# Patient Record
Sex: FEMALE | Race: WHITE | NOT HISPANIC OR LATINO | Employment: UNEMPLOYED | URBAN - METROPOLITAN AREA
[De-identification: names, ages, dates, MRNs, and addresses within clinical notes are randomized per-mention and may not be internally consistent; named-entity substitution may affect disease eponyms.]

---

## 2021-03-18 ENCOUNTER — OFFICE VISIT (OUTPATIENT)
Dept: GASTROENTEROLOGY | Facility: CLINIC | Age: 31
End: 2021-03-18

## 2021-03-18 VITALS
DIASTOLIC BLOOD PRESSURE: 74 MMHG | SYSTOLIC BLOOD PRESSURE: 106 MMHG | WEIGHT: 122.2 LBS | TEMPERATURE: 97.8 F | BODY MASS INDEX: 21.65 KG/M2 | HEART RATE: 80 BPM | HEIGHT: 63 IN | OXYGEN SATURATION: 98 %

## 2021-03-18 DIAGNOSIS — K59.00 CONSTIPATION, UNSPECIFIED CONSTIPATION TYPE: Primary | ICD-10-CM

## 2021-03-18 DIAGNOSIS — R63.0 ANOREXIA: ICD-10-CM

## 2021-03-18 DIAGNOSIS — K58.1 IRRITABLE BOWEL SYNDROME WITH CONSTIPATION: ICD-10-CM

## 2021-03-18 PROCEDURE — 99204 OFFICE O/P NEW MOD 45 MIN: CPT | Performed by: INTERNAL MEDICINE

## 2021-03-18 RX ORDER — TRAZODONE HYDROCHLORIDE 100 MG/1
50 TABLET ORAL
COMMUNITY
Start: 2021-02-20

## 2021-03-18 RX ORDER — ESCITALOPRAM OXALATE 20 MG/1
40 TABLET ORAL EVERY MORNING
COMMUNITY
Start: 2021-03-06 | End: 2023-01-13

## 2021-03-18 NOTE — PATIENT INSTRUCTIONS
1. For constipation, we will have you start Trulance 3 mg once daily with or without food. We have provided you with samples. If you find that it works well for you or if it does not work well, please contact our office at [phone].    2. Maintain at least 8 full glasses of water intake daily.    3. Recommend telephone follow up in 4 weeks with NP for reassessment.

## 2021-03-18 NOTE — PROGRESS NOTES
"Chief Complaint   Patient presents with   • Constipation          History of Present Illness  31-year old female presents today for evaluation for constipation. She reports having a colonoscopy at the age of 19. She has a history of anorexia last year and has since recovered. She reports that constipation was worse when she restricted food. She eventually began having Bms daily to every other day. She did have COVID, which seemed to set her back. She tried MiraLax daily x 7 days with no BM. She did produce a BM with administration of an enema. She then restarted MiraLax with no relief. She tried Milk of Magnesia, which took 12 hours to produce a BM. She reports significant bloating and gas, especially when she has gone several days without having a BM. She has also tried Fiber supplements with no relief. She maintains adequate fluid hydration. She denies any family history of colon cancer. She denies any blood in her stool. Last TSH was normal on 11/4/2019.       Review of Systems   Constitutional: Negative.    HENT: Negative for trouble swallowing.    Cardiovascular: Negative for chest pain.   Gastrointestinal: Positive for abdominal distention and constipation. Negative for abdominal pain, anal bleeding, blood in stool, diarrhea, nausea, rectal pain and vomiting.        Result Review :       TSH (11/04/2019 10:56)      Vital Signs:   /74   Pulse 80   Temp 97.8 °F (36.6 °C)   Ht 160 cm (63\")   Wt 55.4 kg (122 lb 3.2 oz)   SpO2 98%   BMI 21.65 kg/m²     Body mass index is 21.65 kg/m².     Physical Exam  Vitals reviewed.   Constitutional:       Appearance: Normal appearance.   HENT:      Nose: No nasal deformity.   Eyes:      General: No scleral icterus.  Neck:      Comments: Trachea midline.  Cardiovascular:      Rate and Rhythm: Normal rate and regular rhythm.   Pulmonary:      Effort: No respiratory distress.      Breath sounds: Normal breath sounds.   Abdominal:      General: Bowel sounds are normal. " There is no distension.      Palpations: Abdomen is soft. There is no mass.      Tenderness: There is no abdominal tenderness.   Lymphadenopathy:      Comments: No periumbilical lymphadenopathy.   Skin:     General: Skin is warm.   Neurological:      Mental Status: She is alert.           Assessment and Plan    Diagnoses and all orders for this visit:    1. Constipation, unspecified constipation type (Primary)    2. Irritable bowel syndrome with constipation    3. Anorexia         Documentation by Zaina CEJA acting as a scribe in the following sections (HPI, Assessment, Plan) for the undersigned provider.       I have reviewed and confirmed the accuracy of the HPI and Assessment and Plan as documented by the APRN Gibran Vickers MD        Follow Up   Return in about 4 weeks (around 4/15/2021).    Patient Instructions   1. For constipation, we will have you start Trulance 3 mg once daily with or without food. We have provided you with samples. If you find that it works well for you or if it does not work well, please contact our office at [phone].    2. Maintain at least 8 full glasses of water intake daily.    3. Recommend telephone follow up in 4 weeks with NP for reassessment.         Discussion:    For constipation, we will prescribe Trulance 3 mg once daily. Patient to call with update in 1-2 weeks. Plan for telephone visit in 4 weeks.

## 2021-05-11 NOTE — PROGRESS NOTES
Chief Complaint   Patient presents with   • Constipation         History of Present Illness  31-year-old female presents today for telephone follow-up.  She was last seen in office on 3/18/2021.  She is a history of constipation.  Previous treatments have included MiraLAX, milk of magnesia, and an enema.  At her last office visit we started her on Trulance and recommended that she maintain adequate fluid intake.  Last TSH November 2019 was normal at 0.907.    She reports that with taking Trulance daily she has only had 1-2 solid stools over the past month.  She states that her stool is watery and she is experiencing a small amount of bloating.  She denies any melena or hematochezia.  She has a history of anorexia nervosa and sees a nutritionist, and is doing well from that standpoint.  She reports that her weight is stable at 123 pounds.  She has noted that fried foods, particularly fried chicken, seem to trigger her IBS-C symptoms.    She denies having any heartburn, reflux, nausea, vomiting, or dysphagia.  She does report some belching.    You have chosen to receive care through a telephone visit.   Do you consent to use a telephone visit for your medical care today? Yes    Review of Systems   Constitutional: Negative for fever and unexpected weight change.   HENT: Negative for trouble swallowing.    Cardiovascular: Negative for chest pain.   Gastrointestinal: Positive for constipation and diarrhea. Negative for abdominal distention, abdominal pain, anal bleeding, blood in stool, nausea, rectal pain and vomiting.         Result Review :      Office Visit with Gibran Vickers MD (03/18/2021)  CBC AND DIFFERENTIAL (11/04/2019 10:56)  TSH (11/04/2019 10:56)    Assessment and Plan    Diagnoses and all orders for this visit:    1. Constipation, unspecified constipation type (Primary)    Other orders  -     linaclotide (Linzess) 72 MCG capsule capsule; Take 1 capsule by mouth Every Morning Before Breakfast.   Dispense: 90 capsule; Refill: 3         This visit has been rescheduled as a phone visit to comply with patient safety concerns in accordance with CDC recommendations. Total time of discussion was 12 minutes.    Follow Up   Return in about 6 weeks (around 6/23/2021).    Patient Instructions   1.  Discontinue Trulance.    2.  We have sent in a prescription for Linzess 72 mcg.  You will take 1 tablet first thing in the morning each day on an empty stomach.  This prescription has been sent to your pharmacy.  Should you experience significant diarrhea with use, please contact our office for further recommendations.  Also, if this medication is cost prohibitive, please contact our office and we will recommend an alternative therapy.    3.  We will plan for telephone follow-up visit in 6 weeks on 6/23/2021 at 9:15 AM for reassessment of symptoms, or sooner should symptoms worsen or fail to respond accordingly to Linzess.       Discussion:    Bowel movements are watery with Trulance daily.  Patient felt that even if she went to every other day dosing that her stools would not be formed. We discussed the potential option of every other day Trulance dosing.  At this point in time we will prescribe Linzess 72 mcg once daily to see how well patient's symptoms respond.  Amitiza 8 mcg would have been my original choice with once daily dosing, but due to insurance coverage, this did not seem to be a viable option.  Patient was instructed to contact the office if the Linzess caused diarrhea and/or if the prescription was cost prohibitive.  At that time we may be able to try Amitiza if Linzess causes side effects, or switch gears and possibly try a combination of MiraLAX and a fiber supplement.  We will plan for telephone follow-up in 6 weeks for reassessment of symptoms, or sooner should symptoms worsen or fail to improve.  Patient verbalized understand above plan of care and is in agreement.  All questions answered and support  provided.    EMR Dragon/Transcription Disclaimer:  Much of this encounter note is an electronic transcription/translation of spoken language to printed text.  The electronic translation of spoken language may permit erroneous or at times nonsensical words or phrases to be inadvertently transcribed; although I have reviewed the note for such errors, some may still exist.

## 2021-05-12 ENCOUNTER — OFFICE VISIT (OUTPATIENT)
Dept: GASTROENTEROLOGY | Facility: CLINIC | Age: 31
End: 2021-05-12

## 2021-05-12 DIAGNOSIS — R19.7 DIARRHEA, UNSPECIFIED TYPE: ICD-10-CM

## 2021-05-12 DIAGNOSIS — K59.00 CONSTIPATION, UNSPECIFIED CONSTIPATION TYPE: Primary | ICD-10-CM

## 2021-05-12 PROCEDURE — 99214 OFFICE O/P EST MOD 30 MIN: CPT | Performed by: NURSE PRACTITIONER

## 2021-05-12 NOTE — TELEPHONE ENCOUNTER
Pt called and states the Linzess sent in to her pharmacy after today's telephone visit with Zaina is too expensive. She is wanting to know if there is an alternate med she can try. Please advise.

## 2021-05-12 NOTE — PATIENT INSTRUCTIONS
1.  Discontinue Trulance.    2.  We have sent in a prescription for Linzess 72 mcg.  You will take 1 tablet first thing in the morning each day on an empty stomach.  This prescription has been sent to your pharmacy.  Should you experience significant diarrhea with use, please contact our office for further recommendations.  Also, if this medication is cost prohibitive, please contact our office and we will recommend an alternative therapy.    3.  We will plan for telephone follow-up visit in 6 weeks on 6/23/2021 at 9:15 AM for reassessment of symptoms, or sooner should symptoms worsen or fail to respond accordingly to Linzess.

## 2021-05-13 RX ORDER — PLECANATIDE 3 MG/1
3 TABLET ORAL DAILY
Qty: 90 TABLET | Refills: 3 | Status: SHIPPED | OUTPATIENT
Start: 2021-05-13 | End: 2022-05-16

## 2021-05-18 ENCOUNTER — TELEPHONE (OUTPATIENT)
Dept: GASTROENTEROLOGY | Facility: CLINIC | Age: 31
End: 2021-05-18

## 2021-05-18 NOTE — TELEPHONE ENCOUNTER
Patient called stating that she is on Trulance, but she states that it hasn't been working. She states that she has been taking it daily. She states that she has had bowel movements, but not completely. She states that when she does have bowel movements, then it is just water. She states that on Sunday, she was super constipated. Please advise.

## 2021-05-18 NOTE — TELEPHONE ENCOUNTER
Patient would like to continue the Trulance. She states that she spent $180 on the medication two days ago. Please advise if she should continue the Trulance.

## 2021-05-25 ENCOUNTER — TELEPHONE (OUTPATIENT)
Dept: GASTROENTEROLOGY | Facility: CLINIC | Age: 31
End: 2021-05-25

## 2021-05-25 NOTE — TELEPHONE ENCOUNTER
Irasema, the Nutritionist for Rachell called stating Rachell has had an eating disorder in the past. She wanted to check with provider to see how restricted she needs to be on the IBS-D diet. Patient was told to avoid fried and oiled foods. The nutritionist is requesting the least restricted diet for the patient. Please advise. Irasema call back # is [phone].

## 2021-07-06 ENCOUNTER — OFFICE VISIT (OUTPATIENT)
Dept: GASTROENTEROLOGY | Facility: CLINIC | Age: 31
End: 2021-07-06

## 2021-07-06 DIAGNOSIS — K59.00 CONSTIPATION, UNSPECIFIED CONSTIPATION TYPE: Primary | Chronic | ICD-10-CM

## 2021-07-06 PROCEDURE — 99213 OFFICE O/P EST LOW 20 MIN: CPT | Performed by: NURSE PRACTITIONER

## 2021-07-06 NOTE — PROGRESS NOTES
Chief Complaint   Patient presents with   • Follow-up     constipation         History of Present Illness  31-year-old female presents today for telephone follow-up.  She was last seen in office on 5/12/2021.  She has a history of constipation.  Previous treatments have included MiraLAX, milk of magnesia, enemas and Trulance.  She has noted that fried foods such as fried chicken and red meats will worsen symptoms.     She was initially taking Trulance, but it was working a little too well for her and was causing some watery stools.  Linzess 72 mcg was prescribed at her last office visit, but was very cost prohibitive patient did not fill prescription.  Patient reports today that Trulance prescription is costing her $180 per month.  She has reverted back to taking the Trulance due to the cost prohibitive nature of Linzess.  She states that generally she will have a bowel movement in the morning within 2 hours after taking the Trulance that generally consists of a very loose/watery BM.  However, occasionally she will have normal stools.  He has never tried taking the Trulance every other day.  She denies any bloating, abdominal pain, melena, or hematochezia.    You have chosen to receive care through a telephone visit.   Do you consent to use a telephone visit for your medical care today? Yes    Review of Systems   Constitutional: Negative for fever and unexpected weight change.   HENT: Negative for trouble swallowing.    Cardiovascular: Negative for chest pain.   Gastrointestinal: Positive for constipation and diarrhea. Negative for abdominal distention, abdominal pain, anal bleeding, blood in stool, nausea, rectal pain and vomiting.         Result Review :      Office Visit with Zaina Mcneil APRN (05/12/2021)  CBC AND DIFFERENTIAL (11/04/2019 10:56)  TSH (11/04/2019 10:56)    Assessment and Plan    Diagnoses and all orders for this visit:    1. Constipation, unspecified constipation type (Primary)       I spent  13 minutes caring for Rachell on this date of service. This time includes time spent by me in the following activities:preparing for the visit, performing a medically appropriate examination and/or evaluation , ordering medications, tests, or procedures and documenting information in the medical record  This visit has been rescheduled as a phone visit to comply with patient safety concerns in accordance with CDC recommendations. Total time of discussion was 10 minutes.    Follow Up   Return for pending test results and symptoms.    Patient Instructions   1.  For constipation continue Trulance.  You may find that taking this medication every other day helps to better relate your bowel habits without causing the watery stools.    2.  We have put together a sample package of a coupon co-pay card for Trulance, some Trulance samples as well as some samples of the Linzess 72 mcg tablets.  You may want to try the Linzess to see how well it works.  If you find that it works better than the Trulance, you can download a coupon card and we can send in a prescription accordingly.  Do not take the Trulance and Linzess together.  This way we can determine which medication works best to manage her symptoms.    3.  Please contact our office with an update of your symptoms in the next 3 to 4 weeks at [phone] once you have determined which medication works best for you.  Next office follow-up to be determined based upon your response to the above.     Discussion:    Patient was unable to  the Linzess prescription due to cost following her last office visit.  She reverted back to use of Trulance which is causing loose and watery stools most of the time, but does work well to manage her constipation.  She would rather have the loose stools than experience constipation.  However, Trulance is costing her $180 per month.  We have put together a sample package with a Trulance coupon co-pay card, samples of Trulance as well as  samples of Linzess 72 mcg which was prescribed at her last office visit.  Patient was encouraged to try Trulance every other day to see how well that works to manage symptoms and if that did not work well to try to Linzess 72 mcg daily to every other day.  Patient to then contact the office with an update of her symptoms in 3 to 4 weeks to let us know which prescription works best for her and we will prescribe accordingly.  Patient verbalized understand above plan of care and is in agreement.  All questions answered and support provided.      EMR Dragon/Transcription Disclaimer:  This document has been Dictated utilizing Dragon dictation.

## 2022-04-08 ENCOUNTER — TRANSCRIBE ORDERS (OUTPATIENT)
Dept: ADMINISTRATIVE | Facility: HOSPITAL | Age: 32
End: 2022-04-08

## 2022-04-08 DIAGNOSIS — R10.9 RIGHT FLANK PAIN: Primary | ICD-10-CM

## 2022-04-09 ENCOUNTER — HOSPITAL ENCOUNTER (OUTPATIENT)
Dept: CT IMAGING | Facility: HOSPITAL | Age: 32
Discharge: HOME | End: 2022-04-09
Admitting: NURSE PRACTITIONER

## 2022-04-09 DIAGNOSIS — R10.9 RIGHT FLANK PAIN: ICD-10-CM

## 2022-04-09 PROCEDURE — 74176 CT ABD & PELVIS W/O CONTRAST: CPT

## 2022-05-16 RX ORDER — PLECANATIDE 3 MG/1
3 TABLET ORAL DAILY
Qty: 90 TABLET | Refills: 0 | Status: SHIPPED | OUTPATIENT
Start: 2022-05-16 | End: 2022-08-17

## 2022-08-17 RX ORDER — PLECANATIDE 3 MG/1
3 TABLET ORAL DAILY
Qty: 90 TABLET | Refills: 0 | Status: SHIPPED | OUTPATIENT
Start: 2022-08-17 | End: 2022-12-01

## 2022-12-01 RX ORDER — PLECANATIDE 3 MG/1
3 TABLET ORAL DAILY
Qty: 90 TABLET | Refills: 0 | Status: SHIPPED | OUTPATIENT
Start: 2022-12-01

## 2022-12-29 ENCOUNTER — HOSPITAL ENCOUNTER (EMERGENCY)
Facility: HOSPITAL | Age: 32
Discharge: HOME | End: 2022-12-29
Attending: EMERGENCY MEDICINE | Admitting: EMERGENCY MEDICINE
Payer: COMMERCIAL

## 2022-12-29 ENCOUNTER — APPOINTMENT (OUTPATIENT)
Dept: CT IMAGING | Facility: HOSPITAL | Age: 32
End: 2022-12-29
Payer: COMMERCIAL

## 2022-12-29 VITALS
HEART RATE: 75 BPM | TEMPERATURE: 97.9 F | OXYGEN SATURATION: 100 % | DIASTOLIC BLOOD PRESSURE: 78 MMHG | WEIGHT: 130.95 LBS | SYSTOLIC BLOOD PRESSURE: 102 MMHG | HEIGHT: 63 IN | BODY MASS INDEX: 23.2 KG/M2 | RESPIRATION RATE: 12 BRPM

## 2022-12-29 DIAGNOSIS — R10.11 COLICKY RUQ ABDOMINAL PAIN: ICD-10-CM

## 2022-12-29 DIAGNOSIS — R11.0 NAUSEA: ICD-10-CM

## 2022-12-29 DIAGNOSIS — R79.89 ELEVATED LFTS: ICD-10-CM

## 2022-12-29 DIAGNOSIS — K80.20 CALCULUS OF GALLBLADDER WITHOUT CHOLECYSTITIS WITHOUT OBSTRUCTION: Primary | ICD-10-CM

## 2022-12-29 LAB
ALBUMIN SERPL-MCNC: 4.6 G/DL (ref 3.5–5.2)
ALBUMIN/GLOB SERPL: 1.6 G/DL
ALP SERPL-CCNC: 73 U/L (ref 39–117)
ALT SERPL W P-5'-P-CCNC: 61 U/L (ref 1–33)
ANION GAP SERPL CALCULATED.3IONS-SCNC: 9 MMOL/L (ref 5–15)
AST SERPL-CCNC: 96 U/L (ref 1–32)
B-HCG UR QL: NEGATIVE
BACTERIA UR QL AUTO: ABNORMAL /HPF
BASOPHILS # BLD AUTO: 0 10*3/MM3 (ref 0–0.2)
BASOPHILS NFR BLD AUTO: 0.3 % (ref 0–1.5)
BILIRUB SERPL-MCNC: 0.5 MG/DL (ref 0–1.2)
BILIRUB UR QL STRIP: NEGATIVE
BUN SERPL-MCNC: 7 MG/DL (ref 6–20)
BUN/CREAT SERPL: 10.6 (ref 7–25)
CALCIUM SPEC-SCNC: 9.2 MG/DL (ref 8.6–10.5)
CHLORIDE SERPL-SCNC: 102 MMOL/L (ref 98–107)
CLARITY UR: CLEAR
CO2 SERPL-SCNC: 27 MMOL/L (ref 22–29)
COLOR UR: YELLOW
CREAT SERPL-MCNC: 0.66 MG/DL (ref 0.57–1)
DEPRECATED RDW RBC AUTO: 40.3 FL (ref 37–54)
EGFRCR SERPLBLD CKD-EPI 2021: 119.7 ML/MIN/1.73
EOSINOPHIL # BLD AUTO: 0.1 10*3/MM3 (ref 0–0.4)
EOSINOPHIL NFR BLD AUTO: 0.4 % (ref 0.3–6.2)
ERYTHROCYTE [DISTWIDTH] IN BLOOD BY AUTOMATED COUNT: 12.6 % (ref 12.3–15.4)
GLOBULIN UR ELPH-MCNC: 2.8 GM/DL
GLUCOSE SERPL-MCNC: 147 MG/DL (ref 65–99)
GLUCOSE UR STRIP-MCNC: NEGATIVE MG/DL
HCT VFR BLD AUTO: 37.9 % (ref 34–46.6)
HGB BLD-MCNC: 12.6 G/DL (ref 12–15.9)
HGB UR QL STRIP.AUTO: ABNORMAL
HOLD SPECIMEN: NORMAL
HYALINE CASTS UR QL AUTO: ABNORMAL /LPF
KETONES UR QL STRIP: NEGATIVE
LEUKOCYTE ESTERASE UR QL STRIP.AUTO: NEGATIVE
LIPASE SERPL-CCNC: 29 U/L (ref 13–60)
LYMPHOCYTES # BLD AUTO: 1 10*3/MM3 (ref 0.7–3.1)
LYMPHOCYTES NFR BLD AUTO: 7.7 % (ref 19.6–45.3)
MCH RBC QN AUTO: 30.3 PG (ref 26.6–33)
MCHC RBC AUTO-ENTMCNC: 33.2 G/DL (ref 31.5–35.7)
MCV RBC AUTO: 91.3 FL (ref 79–97)
MONOCYTES # BLD AUTO: 0.6 10*3/MM3 (ref 0.1–0.9)
MONOCYTES NFR BLD AUTO: 4.9 % (ref 5–12)
NEUTROPHILS NFR BLD AUTO: 11.2 10*3/MM3 (ref 1.7–7)
NEUTROPHILS NFR BLD AUTO: 86.7 % (ref 42.7–76)
NITRITE UR QL STRIP: NEGATIVE
NRBC BLD AUTO-RTO: 0 /100 WBC (ref 0–0.2)
PH UR STRIP.AUTO: <=5 [PH] (ref 5–8)
PLATELET # BLD AUTO: 319 10*3/MM3 (ref 140–450)
PMV BLD AUTO: 7.4 FL (ref 6–12)
POTASSIUM SERPL-SCNC: 4.2 MMOL/L (ref 3.5–5.2)
PROT SERPL-MCNC: 7.4 G/DL (ref 6–8.5)
PROT UR QL STRIP: NEGATIVE
RBC # BLD AUTO: 4.15 10*6/MM3 (ref 3.77–5.28)
RBC # UR STRIP: ABNORMAL /HPF
REF LAB TEST METHOD: ABNORMAL
SODIUM SERPL-SCNC: 138 MMOL/L (ref 136–145)
SP GR UR STRIP: 1.01 (ref 1–1.03)
SQUAMOUS #/AREA URNS HPF: ABNORMAL /HPF
UROBILINOGEN UR QL STRIP: ABNORMAL
WBC # UR STRIP: ABNORMAL /HPF
WBC NRBC COR # BLD: 12.9 10*3/MM3 (ref 3.4–10.8)

## 2022-12-29 PROCEDURE — 99283 EMERGENCY DEPT VISIT LOW MDM: CPT

## 2022-12-29 PROCEDURE — 0 IOPAMIDOL PER 1 ML: Performed by: EMERGENCY MEDICINE

## 2022-12-29 PROCEDURE — 81001 URINALYSIS AUTO W/SCOPE: CPT | Performed by: PHYSICIAN ASSISTANT

## 2022-12-29 PROCEDURE — 81025 URINE PREGNANCY TEST: CPT | Performed by: PHYSICIAN ASSISTANT

## 2022-12-29 PROCEDURE — 25010000002 ONDANSETRON PER 1 MG: Performed by: PHYSICIAN ASSISTANT

## 2022-12-29 PROCEDURE — 80053 COMPREHEN METABOLIC PANEL: CPT | Performed by: PHYSICIAN ASSISTANT

## 2022-12-29 PROCEDURE — 83690 ASSAY OF LIPASE: CPT | Performed by: PHYSICIAN ASSISTANT

## 2022-12-29 PROCEDURE — 96375 TX/PRO/DX INJ NEW DRUG ADDON: CPT

## 2022-12-29 PROCEDURE — 25010000002 KETOROLAC TROMETHAMINE PER 15 MG: Performed by: PHYSICIAN ASSISTANT

## 2022-12-29 PROCEDURE — 96374 THER/PROPH/DIAG INJ IV PUSH: CPT

## 2022-12-29 PROCEDURE — 74177 CT ABD & PELVIS W/CONTRAST: CPT

## 2022-12-29 PROCEDURE — 85025 COMPLETE CBC W/AUTO DIFF WBC: CPT | Performed by: PHYSICIAN ASSISTANT

## 2022-12-29 RX ORDER — SODIUM CHLORIDE 0.9 % (FLUSH) 0.9 %
10 SYRINGE (ML) INJECTION AS NEEDED
Status: DISCONTINUED | OUTPATIENT
Start: 2022-12-29 | End: 2022-12-29 | Stop reason: HOSPADM

## 2022-12-29 RX ORDER — OXYCODONE HYDROCHLORIDE 5 MG/1
5 TABLET ORAL EVERY 6 HOURS PRN
Qty: 12 TABLET | Refills: 0 | Status: SHIPPED | OUTPATIENT
Start: 2022-12-29 | End: 2023-02-28

## 2022-12-29 RX ORDER — ONDANSETRON 2 MG/ML
4 INJECTION INTRAMUSCULAR; INTRAVENOUS ONCE
Status: COMPLETED | OUTPATIENT
Start: 2022-12-29 | End: 2022-12-29

## 2022-12-29 RX ORDER — ONDANSETRON 4 MG/1
4 TABLET, ORALLY DISINTEGRATING ORAL EVERY 8 HOURS PRN
Qty: 9 TABLET | Refills: 0 | Status: SHIPPED | OUTPATIENT
Start: 2022-12-29 | End: 2023-02-28

## 2022-12-29 RX ORDER — KETOROLAC TROMETHAMINE 15 MG/ML
15 INJECTION, SOLUTION INTRAMUSCULAR; INTRAVENOUS ONCE
Status: COMPLETED | OUTPATIENT
Start: 2022-12-29 | End: 2022-12-29

## 2022-12-29 RX ADMIN — IOPAMIDOL 80 ML: 755 INJECTION, SOLUTION INTRAVENOUS at 15:46

## 2022-12-29 RX ADMIN — ONDANSETRON 4 MG: 2 INJECTION INTRAMUSCULAR; INTRAVENOUS at 14:54

## 2022-12-29 RX ADMIN — SODIUM CHLORIDE, POTASSIUM CHLORIDE, SODIUM LACTATE AND CALCIUM CHLORIDE 1000 ML: 600; 310; 30; 20 INJECTION, SOLUTION INTRAVENOUS at 15:51

## 2022-12-29 RX ADMIN — KETOROLAC TROMETHAMINE 15 MG: 15 INJECTION, SOLUTION INTRAMUSCULAR; INTRAVENOUS at 14:54

## 2022-12-29 NOTE — ED NOTES
Patient complains of severe back and stomach aches that started in the middle of the night. Patient denies urinary changes or diarrhea or constipation. Patient reports taking tylenol with some relief. Patient reports pain is upper abdomen on both sides. Patient reports history of gall stone

## 2022-12-29 NOTE — ED PROVIDER NOTES
Subjective    Provider in Triage Note  Patient is a 32-year-old female presents to the ED with complaints of abdominal pain and mid back pain that started last night.  She states the pain waxes and wanes in intensity.  She describes as a sharp type pain.  She reports associated nausea. no vomiting, diarrhea, or urinary complaints.  No fever.  She tried taking Tylenol with minimal relief.      History of Present Illness  Agree with provider in triage note    32-year-old female presents with pain between her scapula that radiates through to her epigastrium.  She reports nausea, denies associated vomiting diarrhea melena hematochezia.  Denies urinary symptoms.  Denies fever sweats chills.  She does report a history of gallstones and IBS-C.      Last menstrual period 12/25/2022 no contraceptive    Abdominal surgeries: Right oophorectomy and salpingectomy due to torsion    N.p.o. since noon-French fries    1. Location: Scapula  2. Quality: Sharp, shooting  3. Severity: Mild to moderate  4. Worsening factors: Eating  5. Alleviating factors: Anorexia  6. Onset: Yesterday  7. Radiation: Epigastrium  8. Frequency: Constant with periods of intensity  9. Co-morbidities: Past Medical History:  No date: Anorexia  No date: OCD (obsessive compulsive disorder)      History provided by:  Patient and spouse   used: No        Review of Systems   Constitutional: Negative for appetite change, chills, diaphoresis and fever.   HENT: Negative.    Respiratory: Negative.    Cardiovascular: Negative.    Gastrointestinal: Positive for abdominal pain. Negative for blood in stool, constipation, diarrhea, nausea and vomiting.   Genitourinary: Negative for decreased urine volume, difficulty urinating, flank pain and hematuria.   Musculoskeletal: Negative.    Skin: Negative for color change, pallor and rash.   Neurological: Negative.    Hematological: Negative for adenopathy.   Psychiatric/Behavioral: Negative.    All other  systems reviewed and are negative.      Past Medical History:   Diagnosis Date   • Anorexia    • OCD (obsessive compulsive disorder)        Allergies   Allergen Reactions   • Penicillins Itching       Past Surgical History:   Procedure Laterality Date   • COLONOSCOPY         Family History   Problem Relation Age of Onset   • Colon cancer Neg Hx    • Colon polyps Neg Hx    • Irritable bowel syndrome Neg Hx    • Ulcerative colitis Neg Hx    • Crohn's disease Neg Hx        Social History     Socioeconomic History   • Marital status:    Tobacco Use   • Smoking status: Never   • Smokeless tobacco: Never   Substance and Sexual Activity   • Alcohol use: Yes     Comment: socially   • Drug use: Never   • Sexual activity: Defer           Objective   Physical Exam  Vitals and nursing note reviewed.   Constitutional:       General: She is awake. She is not in acute distress.     Appearance: Normal appearance. She is well-developed. She is not ill-appearing, toxic-appearing or diaphoretic.   HENT:      Mouth/Throat:      Mouth: Mucous membranes are moist.   Eyes:      General: No scleral icterus.  Cardiovascular:      Rate and Rhythm: Normal rate and regular rhythm.      Heart sounds: Normal heart sounds, S1 normal and S2 normal. Heart sounds not distant. No murmur heard.    No friction rub. No gallop.   Pulmonary:      Effort: Pulmonary effort is normal.      Breath sounds: Normal breath sounds and air entry.   Abdominal:      General: Abdomen is flat. Bowel sounds are normal. There is no distension.      Palpations: Abdomen is soft. There is no hepatomegaly, splenomegaly or mass.      Tenderness: There is abdominal tenderness in the right upper quadrant and epigastric area. There is no right CVA tenderness, left CVA tenderness, guarding or rebound.      Hernia: No hernia is present.       Skin:     General: Skin is warm and dry.      Capillary Refill: Capillary refill takes less than 2 seconds.      Coloration: Skin is  not jaundiced or pale.      Findings: No rash.   Neurological:      Mental Status: She is alert and oriented to person, place, and time.      GCS: GCS eye subscore is 4. GCS verbal subscore is 5. GCS motor subscore is 6.   Psychiatric:         Mood and Affect: Mood normal.         Behavior: Behavior normal. Behavior is cooperative.         Thought Content: Thought content normal.         Judgment: Judgment normal.         Procedures           ED Course    CT Abdomen Pelvis With Contrast    Result Date: 12/29/2022   1. Liquid stool is noted diffusely within the colon with a few scattered air-fluid levels. Findings suggest a nonspecific diarrheal illness. No significant inflammatory change or wall thickening noted of the GI tract. The appendix is visualized and appears normal. 2. Mild intrahepatic biliary ductal dilation is questioned. No significant dilation of the common duct is noted. Please correlate with liver function test and bilirubin levels. There is cholelithiasis but no evidence of acute cholecystitis appreciated. Ultrasound can always be considered to further evaluate if clinically indicated    Electronically Signed By-Catracho Bruno On:12/29/2022 4:11 PM This report was finalized on [id number] by  Catracho Bruno, .    Medications   ondansetron (ZOFRAN) injection 4 mg (4 mg Intravenous Given 12/29/22 1454)   ketorolac (TORADOL) injection 15 mg (15 mg Intravenous Given 12/29/22 1454)   iopamidol (ISOVUE-370) 76 % injection 100 mL (80 mL Intravenous Given 12/29/22 1546)   lactated ringers bolus 1,000 mL (0 mL Intravenous Stopped 12/29/22 1621)     Labs Reviewed   COMPREHENSIVE METABOLIC PANEL - Abnormal; Notable for the following components:       Result Value    Glucose 147 (*)     ALT (SGPT) 61 (*)     AST (SGOT) 96 (*)     All other components within normal limits    Narrative:     GFR Normal >60  Chronic Kidney Disease <60  Kidney Failure <15     URINALYSIS W/ MICROSCOPIC IF INDICATED (NO  CULTURE) - Abnormal; Notable for the following components:    Blood, UA Moderate (2+) (*)     All other components within normal limits   CBC WITH AUTO DIFFERENTIAL - Abnormal; Notable for the following components:    WBC 12.90 (*)     Neutrophil % 86.7 (*)     Lymphocyte % 7.7 (*)     Monocyte % 4.9 (*)     Neutrophils, Absolute 11.20 (*)     All other components within normal limits   URINALYSIS, MICROSCOPIC ONLY - Abnormal; Notable for the following components:    RBC, UA 6-12 (*)     WBC, UA 0-2 (*)     All other components within normal limits   LIPASE - Normal   PREGNANCY, URINE - Normal   CBC AND DIFFERENTIAL    Narrative:     The following orders were created for panel order CBC & Differential.  Procedure                               Abnormality         Status                     ---------                               -----------         ------                     CBC Auto Differential[[id number]]        Abnormal            Final result                 Please view results for these tests on the individual orders.   EXTRA TUBES    Narrative:     The following orders were created for panel order Extra Tubes.  Procedure                               Abnormality         Status                     ---------                               -----------         ------                     Gold Top - SST[[id number]]                                   Final result                 Please view results for these tests on the individual orders.   [facility]                                            Medical Decision Making  Chart Review: 8/24/2022 patient was seen by cardiology for palpitations, see note below.  Comorbidity: Past Medical History:  No date: Anorexia  No date: OCD (obsessive compulsive disorder)  Imaging: Was interpreted by physician and reviewed by myself: CT Abdomen Pelvis With Contrast   Final Result         1. Liquid stool is noted diffusely within the colon with a few scattered    air-fluid levels.  Findings suggest a nonspecific diarrheal illness. No    significant inflammatory change or wall thickening noted of the GI    tract. The appendix is visualized and appears normal.    2. Mild intrahepatic biliary ductal dilation is questioned. No    significant dilation of the common duct is noted. Please correlate with    liver function test and bilirubin levels. There is cholelithiasis but no    evidence of acute cholecystitis appreciated. Ultrasound can always be    considered to further evaluate if clinically indicated                   Electronically Signed By-Catracho Bruno On:12/29/2022 4:11 PM    This report was finalized on [id number] by  Catracho Bruno, .    Patient undressed and placed in gown for exam.  Appropriate PPE worn during patient exam. Patient is alert and oriented x3.  S1-S2 heart sounds on exam.  Lungs are clear to auscultation.  Abdomen is soft, round, tender in the epigastrium and right upper quadrant.  IV established and labs obtained per provider in triage.  Abdominal work-up initiated.  CT the abdomen pelvis with IV contrast obtained with the above findings.  Patient was given lactated Ringer's 1 L bolus.  She was given Toradol 15 mg IV and Zofran 4 mg IV per provider in triage. White blood cell count 12,900 with a left shift platelets 319 hemoglobin 12.6 hematocrit 37.9 ALT 61 AST 96, otherwise unremarkable CMP urine showed no signs of infection pregnancy negative lipase within normal limits.  CT was significant for liquid stool in the colon nonspecific.  Also mild intrahepatic biliary duct dilatation questionable but no common bile duct dilatation.  Cholelithiasis without cholecystitis.  Patient was given outpatient referral to general surgery.  She was given prescriptions for oxycodone and Zofran.  Spoke with Kimberly, ED pharmacist who performed inspect, see media for results.    Upon reassessment, she reports relief with medications given.  She is pink warm dry no distress vital  signs are stable.    Disposition/Treatment: Discussed results with patient, verbalized understanding.  Discussed reasons to return to the ER, patient verbalized understanding.  Agreeable with plan of care.  Patient was stable upon discharge.       Part of this note may be an electronic transcription/translation of spoken language to printed text using the Dragon Dictation System.         Calculus of gallbladder without cholecystitis without obstruction: acute illness or injury  Colicky RUQ abdominal pain: acute illness or injury  Elevated LFTs: acute illness or injury  Nausea: acute illness or injury  Amount and/or Complexity of Data Reviewed  External Data Reviewed: notes.     Details: CARDIOLOGY REPORT  FACILITY: [facility]    PATIENT NAME/: JORDAN MC    1990  UNIT/AGE/GENDER:      AGE: 32 YR        GENDER: F  UNIT NUMBER: [id number]  ACCOUNT NUMBER: [id number]  ACCESSION NUMBER: [id number]    DATE OF EXAM: 2022  07:14  EXAMINATION(S): ECHO DOPPLER 2D MMODE SPECT COLOR COMPLETE    *AMENDED REPORT*                                                         Procedure   Information  Procedure type:        Echo  Proc. sub type:        TTE procedure: ECHO DOPPLER 2D MMODE SPECT COLOR   COMPLETE.  Start date time:       2022                                     Blood   pressure:       100 / 70 mmHg  Accession no:          [id number]    Procedure Staff  Referring Physician:       Peggy Huertas  Sonographer:               Regine Morataya  Interpreting Physician: Peggy Huertas        Clinical Indications  Palpitations.    Physician Conclusions  Any valve disease noted in the report is non-rheumatic unless otherwise   specifically noted.  Labs:  Decision-making details documented in ED Course.  Radiology:  Decision-making details documented in ED Course.  ECG/medicine tests:  Decision-making details documented in ED  Course.      Risk  Prescription drug management.          Final diagnoses:   Calculus of gallbladder without cholecystitis without obstruction   Elevated LFTs   Colicky RUQ abdominal pain   Nausea       ED Disposition  ED Disposition     ED Disposition   Discharge    Condition   Stable    Comment   --             Devon Coronel MD  [address]  [id number]  [facility] [address] [phone]    Schedule an appointment as soon as possible for a visit       Ezequiel Flores MD  [address] [place] [address] [phone]    Schedule an appointment as soon as possible for a visit       [facility] EMERGENCY DEPARTMENT  [address] [facility] [address] [phone]  Go to   If symptoms worsen         Medication List      New Prescriptions    ondansetron ODT 4 MG disintegrating tablet  Commonly known as: ZOFRAN-ODT  Place 1 tablet on the tongue Every 8 (Eight) Hours As Needed for Nausea.     oxyCODONE 5 MG immediate release tablet  Commonly known as: Roxicodone  Take 1 tablet by mouth Every 6 (Six) Hours As Needed for Severe Pain.           Where to Get Your Medications      These medications were sent to [id number] - [place], [place] - [address] - [phone] PH - [phone] FX  [address] [place] [address]    Phone: [phone]   · ondansetron ODT 4 MG disintegrating tablet  · oxyCODONE 5 MG immediate release tablet          Soila Barahona, APRN  12/30/22 0039

## 2022-12-29 NOTE — DISCHARGE INSTRUCTIONS
Take Zofran and pain medication as prescribed.  No driving on pain medication.  Follow gallbladder diet plan.  Call surgeon for further work-up.  Return to the ER for new or worsening symptoms.

## 2023-01-13 ENCOUNTER — OFFICE VISIT (OUTPATIENT)
Dept: SURGERY | Facility: CLINIC | Age: 33
End: 2023-01-13
Payer: COMMERCIAL

## 2023-01-13 VITALS
HEIGHT: 63 IN | BODY MASS INDEX: 22.01 KG/M2 | OXYGEN SATURATION: 98 % | TEMPERATURE: 98.4 F | SYSTOLIC BLOOD PRESSURE: 133 MMHG | WEIGHT: 124.2 LBS | HEART RATE: 79 BPM | DIASTOLIC BLOOD PRESSURE: 77 MMHG | RESPIRATION RATE: 16 BRPM

## 2023-01-13 DIAGNOSIS — K80.20 SYMPTOMATIC CHOLELITHIASIS: Primary | ICD-10-CM

## 2023-01-13 PROCEDURE — 99204 OFFICE O/P NEW MOD 45 MIN: CPT | Performed by: SURGERY

## 2023-01-13 RX ORDER — SODIUM CHLORIDE 0.9 % (FLUSH) 0.9 %
3 SYRINGE (ML) INJECTION EVERY 12 HOURS SCHEDULED
Status: CANCELLED | OUTPATIENT
Start: 2023-01-13

## 2023-01-13 RX ORDER — SODIUM CHLORIDE 9 MG/ML
100 INJECTION, SOLUTION INTRAVENOUS CONTINUOUS
Status: CANCELLED | OUTPATIENT
Start: 2023-01-13

## 2023-01-13 RX ORDER — SODIUM CHLORIDE 9 MG/ML
40 INJECTION, SOLUTION INTRAVENOUS AS NEEDED
Status: CANCELLED | OUTPATIENT
Start: 2023-01-13

## 2023-01-13 RX ORDER — SODIUM CHLORIDE 0.9 % (FLUSH) 0.9 %
3-10 SYRINGE (ML) INJECTION AS NEEDED
Status: CANCELLED | OUTPATIENT
Start: 2023-01-13

## 2023-01-13 RX ORDER — PAROXETINE HYDROCHLORIDE HEMIHYDRATE 25 MG/1
25 TABLET, FILM COATED, EXTENDED RELEASE ORAL NIGHTLY
COMMUNITY
Start: 2022-12-28

## 2023-01-13 NOTE — H&P (VIEW-ONLY)
"Arnold Walker is a 32 y.o. female.   Chief Complaint   Patient presents with   • Abdominal Pain     New Pt Ref [facility] ER, Gallbladder      /77 (BP Location: Right arm, Patient Position: Sitting, Cuff Size: Adult)   Pulse 79   Temp 98.4 °F (36.9 °C) (Infrared)   Resp 16   Ht 160 cm (63\")   Wt 56.3 kg (124 lb 3.2 oz)   LMP 12/25/2022   SpO2 98%   BMI 22.00 kg/m²     HISTORY OF PRESENT ILLNESS:  32-year-old lady who has not been referred to me for evaluation of epigastric abdominal pain.  She says that she had a severe flareup back in December caused a sharp right upper quadrant abdominal discomfort with radiation to the back.  The last about an hour was crippling pain.  Resolved and then the next morning similarly had a another flareup that was not quite as bad but with the second flareup she went to the hospital for evaluation.  On evaluation she was found to have some mild elevation in her LFTs and a CT scan demonstrated gallstones at the infundibulum of the gallbladder.  She was diagnosed with biliary colic and then sent to me for discussion about gallbladder removal.  She does have a history of irritable bowel syndrome constipation present does not have any nausea or vomiting      Outpatient Encounter Medications as of 1/13/2023   Medication Sig Dispense Refill   • ondansetron ODT (ZOFRAN-ODT) 4 MG disintegrating tablet Place 1 tablet on the tongue Every 8 (Eight) Hours As Needed for Nausea. 9 tablet 0   • oxyCODONE (Roxicodone) 5 MG immediate release tablet Take 1 tablet by mouth Every 6 (Six) Hours As Needed for Severe Pain. 12 tablet 0   • PARoxetine CR (PAXIL-CR) 25 MG 24 hr tablet Take 25 mg by mouth Daily.     • traZODone (DESYREL) 100 MG tablet Take 50 mg by mouth every night at bedtime.     • Trulance 3 MG tablet TAKE 1 TABLET BY MOUTH DAILY 90 tablet 0   • [DISCONTINUED] escitalopram (LEXAPRO) 20 MG tablet Take 40 mg by mouth Every Morning.       No facility-administered " encounter medications on file as of 1/13/2023.         The following portions of the patient's history were reviewed and updated as appropriate: allergies, current medications, past family history, past medical history, past social history, past surgical history and problem list.    Review of Systems  Complete review of systems been obtained is positive for tinnitus the remainder of the review of systems is negative  Objective     Physical Exam  Constitutional:       Appearance: Normal appearance.   HENT:      Head: Normocephalic and atraumatic.   Cardiovascular:      Rate and Rhythm: Normal rate.   Pulmonary:      Effort: Pulmonary effort is normal. No respiratory distress.   Abdominal:      General: There is no distension.      Palpations: Abdomen is soft.      Comments: Healed infraumbilical incision from previous laparoscopy   Skin:     General: Skin is warm and dry.   Neurological:      General: No focal deficit present.      Mental Status: She is alert. Mental status is at baseline.   Psychiatric:         Mood and Affect: Mood normal.         Behavior: Behavior normal.           Assessment & Plan   Diagnoses and all orders for this visit:    1. Symptomatic cholelithiasis (Primary)  -     Case Request; Standing  -     sodium chloride 0.9 % infusion  -     sodium chloride 0.9 % flush 3 mL  -     sodium chloride 0.9 % flush 3-10 mL  -     sodium chloride 0.9 % infusion 40 mL  -     ceFAZolin (ANCEF) 2 g in sodium chloride 0.9 % 100 mL IVPB  -     Case Request    Other orders  -     Follow Anesthesia Guidelines / Protocol; Future  -     Obtain Informed Consent; Future  -     Provide NPO Instructions to Patient; Future  -     Chlorhexidine Skin Prep; Future  -     Follow Anesthesia Guidelines / Protocol; Standing  -     Verify / Perform Chlorhexidine Skin Prep; Standing  -     Instructions on Coughing, Deep Breathing & Incentive Spirometry; Standing  -     Notify Physician - Standard; Standing  -     Insert  Peripheral IV; Standing  -     Saline Lock & Maintain IV Access; Standing    I have counseled her about the natural history of gallstones the nonoperative management of symptoms we talked about the steps of cholecystectomy the anticipated cover the possible complications.  Specifically talked about open surgery bile duct injury and life without a gallbladder.  Because of the elevation in her LFTs I have offered to shoot a cholangiogram to evaluate for any common bile duct stones.  After our discussion about symptomatic cholelithiasis she understands the risks and is going to proceed with laparoscopic cholecystectomy.    Moderate data reviewed including CT scan abdomen and pelvis from December the lab work from the emergency department at that time the ER notes previous imaging including her CT scan from [place] last year independent review of these 2 CT scans and ordering lab work for surgery counseling about major abdominal surgery without significant risk factors for morbidity    Ezequiel Flores MD  1/13/2023  9:45 AM EST    This note was created using Dragon Voice Recognition software.

## 2023-01-13 NOTE — PROGRESS NOTES
"Arnold Walker is a 32 y.o. female.   Chief Complaint   Patient presents with   • Abdominal Pain     New Pt Ref [facility] ER, Gallbladder      /77 (BP Location: Right arm, Patient Position: Sitting, Cuff Size: Adult)   Pulse 79   Temp 98.4 °F (36.9 °C) (Infrared)   Resp 16   Ht 160 cm (63\")   Wt 56.3 kg (124 lb 3.2 oz)   LMP 12/25/2022   SpO2 98%   BMI 22.00 kg/m²     HISTORY OF PRESENT ILLNESS:  32-year-old lady who has not been referred to me for evaluation of epigastric abdominal pain.  She says that she had a severe flareup back in December caused a sharp right upper quadrant abdominal discomfort with radiation to the back.  The last about an hour was crippling pain.  Resolved and then the next morning similarly had a another flareup that was not quite as bad but with the second flareup she went to the hospital for evaluation.  On evaluation she was found to have some mild elevation in her LFTs and a CT scan demonstrated gallstones at the infundibulum of the gallbladder.  She was diagnosed with biliary colic and then sent to me for discussion about gallbladder removal.  She does have a history of irritable bowel syndrome constipation present does not have any nausea or vomiting      Outpatient Encounter Medications as of 1/13/2023   Medication Sig Dispense Refill   • ondansetron ODT (ZOFRAN-ODT) 4 MG disintegrating tablet Place 1 tablet on the tongue Every 8 (Eight) Hours As Needed for Nausea. 9 tablet 0   • oxyCODONE (Roxicodone) 5 MG immediate release tablet Take 1 tablet by mouth Every 6 (Six) Hours As Needed for Severe Pain. 12 tablet 0   • PARoxetine CR (PAXIL-CR) 25 MG 24 hr tablet Take 25 mg by mouth Daily.     • traZODone (DESYREL) 100 MG tablet Take 50 mg by mouth every night at bedtime.     • Trulance 3 MG tablet TAKE 1 TABLET BY MOUTH DAILY 90 tablet 0   • [DISCONTINUED] escitalopram (LEXAPRO) 20 MG tablet Take 40 mg by mouth Every Morning.       No facility-administered " encounter medications on file as of 1/13/2023.         The following portions of the patient's history were reviewed and updated as appropriate: allergies, current medications, past family history, past medical history, past social history, past surgical history and problem list.    Review of Systems  Complete review of systems been obtained is positive for tinnitus the remainder of the review of systems is negative  Objective     Physical Exam  Constitutional:       Appearance: Normal appearance.   HENT:      Head: Normocephalic and atraumatic.   Cardiovascular:      Rate and Rhythm: Normal rate.   Pulmonary:      Effort: Pulmonary effort is normal. No respiratory distress.   Abdominal:      General: There is no distension.      Palpations: Abdomen is soft.      Comments: Healed infraumbilical incision from previous laparoscopy   Skin:     General: Skin is warm and dry.   Neurological:      General: No focal deficit present.      Mental Status: She is alert. Mental status is at baseline.   Psychiatric:         Mood and Affect: Mood normal.         Behavior: Behavior normal.           Assessment & Plan   Diagnoses and all orders for this visit:    1. Symptomatic cholelithiasis (Primary)  -     Case Request; Standing  -     sodium chloride 0.9 % infusion  -     sodium chloride 0.9 % flush 3 mL  -     sodium chloride 0.9 % flush 3-10 mL  -     sodium chloride 0.9 % infusion 40 mL  -     ceFAZolin (ANCEF) 2 g in sodium chloride 0.9 % 100 mL IVPB  -     Case Request    Other orders  -     Follow Anesthesia Guidelines / Protocol; Future  -     Obtain Informed Consent; Future  -     Provide NPO Instructions to Patient; Future  -     Chlorhexidine Skin Prep; Future  -     Follow Anesthesia Guidelines / Protocol; Standing  -     Verify / Perform Chlorhexidine Skin Prep; Standing  -     Instructions on Coughing, Deep Breathing & Incentive Spirometry; Standing  -     Notify Physician - Standard; Standing  -     Insert  Peripheral IV; Standing  -     Saline Lock & Maintain IV Access; Standing    I have counseled her about the natural history of gallstones the nonoperative management of symptoms we talked about the steps of cholecystectomy the anticipated cover the possible complications.  Specifically talked about open surgery bile duct injury and life without a gallbladder.  Because of the elevation in her LFTs I have offered to shoot a cholangiogram to evaluate for any common bile duct stones.  After our discussion about symptomatic cholelithiasis she understands the risks and is going to proceed with laparoscopic cholecystectomy.    Moderate data reviewed including CT scan abdomen and pelvis from December the lab work from the emergency department at that time the ER notes previous imaging including her CT scan from [place] last year independent review of these 2 CT scans and ordering lab work for surgery counseling about major abdominal surgery without significant risk factors for morbidity    Ezequiel Flores MD  1/13/2023  9:45 AM EST    This note was created using Dragon Voice Recognition software.

## 2023-01-13 NOTE — ADDENDUM NOTE
Addended by: DELVIS GALVAN on: 1/13/2023 09:49 AM     Modules accepted: Orders, Level of Service, SmartSet

## 2023-01-16 PROBLEM — K80.20 SYMPTOMATIC CHOLELITHIASIS: Status: ACTIVE | Noted: 2023-01-16

## 2023-01-20 ENCOUNTER — LAB (OUTPATIENT)
Dept: LAB | Facility: HOSPITAL | Age: 33
End: 2023-01-20
Payer: COMMERCIAL

## 2023-01-20 DIAGNOSIS — K80.20 SYMPTOMATIC CHOLELITHIASIS: ICD-10-CM

## 2023-01-20 LAB
ALBUMIN SERPL-MCNC: 4.5 G/DL (ref 3.5–5.2)
ALBUMIN/GLOB SERPL: 1.9 G/DL
ALP SERPL-CCNC: 61 U/L (ref 39–117)
ALT SERPL W P-5'-P-CCNC: 14 U/L (ref 1–33)
ANION GAP SERPL CALCULATED.3IONS-SCNC: 6 MMOL/L (ref 5–15)
AST SERPL-CCNC: 16 U/L (ref 1–32)
BILIRUB SERPL-MCNC: 0.4 MG/DL (ref 0–1.2)
BUN SERPL-MCNC: 9 MG/DL (ref 6–20)
BUN/CREAT SERPL: 13.4 (ref 7–25)
CALCIUM SPEC-SCNC: 9.1 MG/DL (ref 8.6–10.5)
CHLORIDE SERPL-SCNC: 106 MMOL/L (ref 98–107)
CO2 SERPL-SCNC: 28 MMOL/L (ref 22–29)
CREAT SERPL-MCNC: 0.67 MG/DL (ref 0.57–1)
DEPRECATED RDW RBC AUTO: 39.7 FL (ref 37–54)
EGFRCR SERPLBLD CKD-EPI 2021: 119.3 ML/MIN/1.73
ERYTHROCYTE [DISTWIDTH] IN BLOOD BY AUTOMATED COUNT: 11.8 % (ref 12.3–15.4)
GLOBULIN UR ELPH-MCNC: 2.4 GM/DL
GLUCOSE SERPL-MCNC: 78 MG/DL (ref 65–99)
HCT VFR BLD AUTO: 35 % (ref 34–46.6)
HGB BLD-MCNC: 11.8 G/DL (ref 12–15.9)
MCH RBC QN AUTO: 31 PG (ref 26.6–33)
MCHC RBC AUTO-ENTMCNC: 33.7 G/DL (ref 31.5–35.7)
MCV RBC AUTO: 91.9 FL (ref 79–97)
PLATELET # BLD AUTO: 247 10*3/MM3 (ref 140–450)
PMV BLD AUTO: 9.9 FL (ref 6–12)
POTASSIUM SERPL-SCNC: 3.8 MMOL/L (ref 3.5–5.2)
PROT SERPL-MCNC: 6.9 G/DL (ref 6–8.5)
RBC # BLD AUTO: 3.81 10*6/MM3 (ref 3.77–5.28)
SODIUM SERPL-SCNC: 140 MMOL/L (ref 136–145)
WBC NRBC COR # BLD: 5.7 10*3/MM3 (ref 3.4–10.8)

## 2023-01-20 PROCEDURE — 85027 COMPLETE CBC AUTOMATED: CPT

## 2023-01-20 PROCEDURE — 36415 COLL VENOUS BLD VENIPUNCTURE: CPT

## 2023-01-20 PROCEDURE — 80053 COMPREHEN METABOLIC PANEL: CPT

## 2023-01-25 ENCOUNTER — HOSPITAL ENCOUNTER (OUTPATIENT)
Facility: HOSPITAL | Age: 33
Setting detail: HOSPITAL OUTPATIENT SURGERY
Discharge: HOME | End: 2023-01-25
Attending: SURGERY | Admitting: SURGERY
Payer: COMMERCIAL

## 2023-01-25 ENCOUNTER — ANESTHESIA EVENT (OUTPATIENT)
Dept: PERIOP | Facility: HOSPITAL | Age: 33
End: 2023-01-25
Payer: COMMERCIAL

## 2023-01-25 ENCOUNTER — APPOINTMENT (OUTPATIENT)
Dept: GENERAL RADIOLOGY | Facility: HOSPITAL | Age: 33
End: 2023-01-25
Payer: COMMERCIAL

## 2023-01-25 ENCOUNTER — ANESTHESIA (OUTPATIENT)
Dept: PERIOP | Facility: HOSPITAL | Age: 33
End: 2023-01-25
Payer: COMMERCIAL

## 2023-01-25 VITALS
HEIGHT: 63 IN | WEIGHT: 121.6 LBS | OXYGEN SATURATION: 96 % | TEMPERATURE: 98 F | BODY MASS INDEX: 21.55 KG/M2 | HEART RATE: 81 BPM | DIASTOLIC BLOOD PRESSURE: 65 MMHG | RESPIRATION RATE: 12 BRPM | SYSTOLIC BLOOD PRESSURE: 110 MMHG

## 2023-01-25 DIAGNOSIS — K80.20 SYMPTOMATIC CHOLELITHIASIS: ICD-10-CM

## 2023-01-25 LAB — B-HCG UR QL: NEGATIVE

## 2023-01-25 PROCEDURE — 47563 LAPARO CHOLECYSTECTOMY/GRAPH: CPT | Performed by: SURGERY

## 2023-01-25 PROCEDURE — 25010000002 PROPOFOL 200 MG/20ML EMULSION: Performed by: NURSE ANESTHETIST, CERTIFIED REGISTERED

## 2023-01-25 PROCEDURE — 25010000002 ONDANSETRON PER 1 MG: Performed by: NURSE ANESTHETIST, CERTIFIED REGISTERED

## 2023-01-25 PROCEDURE — 25010000002 DEXAMETHASONE PER 1 MG: Performed by: NURSE ANESTHETIST, CERTIFIED REGISTERED

## 2023-01-25 PROCEDURE — 0 IOHEXOL 300 MG/ML SOLUTION: Performed by: SURGERY

## 2023-01-25 PROCEDURE — 74300 X-RAY BILE DUCTS/PANCREAS: CPT

## 2023-01-25 PROCEDURE — 88304 TISSUE EXAM BY PATHOLOGIST: CPT | Performed by: SURGERY

## 2023-01-25 PROCEDURE — 47563 LAPARO CHOLECYSTECTOMY/GRAPH: CPT

## 2023-01-25 PROCEDURE — 81025 URINE PREGNANCY TEST: CPT | Performed by: SURGERY

## 2023-01-25 PROCEDURE — 76000 FLUOROSCOPY <1 HR PHYS/QHP: CPT

## 2023-01-25 PROCEDURE — 25010000002 FENTANYL CITRATE (PF) 100 MCG/2ML SOLUTION: Performed by: NURSE ANESTHETIST, CERTIFIED REGISTERED

## 2023-01-25 PROCEDURE — 25010000002 HYDROMORPHONE 1 MG/ML SOLUTION: Performed by: NURSE ANESTHETIST, CERTIFIED REGISTERED

## 2023-01-25 DEVICE — LIGAMAX 5 MM ENDOSCOPIC MULTIPLE CLIP APPLIER
Type: IMPLANTABLE DEVICE | Site: ABDOMEN | Status: FUNCTIONAL
Brand: LIGAMAX

## 2023-01-25 RX ORDER — PHENYLEPHRINE HCL IN 0.9% NACL 1 MG/10 ML
SYRINGE (ML) INTRAVENOUS AS NEEDED
Status: DISCONTINUED | OUTPATIENT
Start: 2023-01-25 | End: 2023-01-25 | Stop reason: SURG

## 2023-01-25 RX ORDER — FENTANYL CITRATE 50 UG/ML
INJECTION, SOLUTION INTRAMUSCULAR; INTRAVENOUS AS NEEDED
Status: DISCONTINUED | OUTPATIENT
Start: 2023-01-25 | End: 2023-01-25 | Stop reason: SURG

## 2023-01-25 RX ORDER — SODIUM CHLORIDE 0.9 % (FLUSH) 0.9 %
3 SYRINGE (ML) INJECTION EVERY 12 HOURS SCHEDULED
Status: DISCONTINUED | OUTPATIENT
Start: 2023-01-25 | End: 2023-01-25 | Stop reason: HOSPADM

## 2023-01-25 RX ORDER — ROCURONIUM BROMIDE 10 MG/ML
INJECTION, SOLUTION INTRAVENOUS AS NEEDED
Status: DISCONTINUED | OUTPATIENT
Start: 2023-01-25 | End: 2023-01-25 | Stop reason: SURG

## 2023-01-25 RX ORDER — HYDROCODONE BITARTRATE AND ACETAMINOPHEN 10; 325 MG/1; MG/1
1 TABLET ORAL EVERY 4 HOURS PRN
Status: DISCONTINUED | OUTPATIENT
Start: 2023-01-25 | End: 2023-01-25 | Stop reason: HOSPADM

## 2023-01-25 RX ORDER — HYDROCODONE BITARTRATE AND ACETAMINOPHEN 5; 325 MG/1; MG/1
1 TABLET ORAL EVERY 4 HOURS PRN
Qty: 10 TABLET | Refills: 0 | Status: SHIPPED | OUTPATIENT
Start: 2023-01-25 | End: 2023-02-28

## 2023-01-25 RX ORDER — BUPIVACAINE HYDROCHLORIDE AND EPINEPHRINE 5; 5 MG/ML; UG/ML
INJECTION, SOLUTION EPIDURAL; INTRACAUDAL; PERINEURAL AS NEEDED
Status: DISCONTINUED | OUTPATIENT
Start: 2023-01-25 | End: 2023-01-25 | Stop reason: HOSPADM

## 2023-01-25 RX ORDER — CLINDAMYCIN PHOSPHATE 600 MG/50ML
600 INJECTION, SOLUTION INTRAVENOUS ONCE
Status: COMPLETED | OUTPATIENT
Start: 2023-01-25 | End: 2023-01-25

## 2023-01-25 RX ORDER — PROPOFOL 10 MG/ML
INJECTION, EMULSION INTRAVENOUS AS NEEDED
Status: DISCONTINUED | OUTPATIENT
Start: 2023-01-25 | End: 2023-01-25 | Stop reason: SURG

## 2023-01-25 RX ORDER — SODIUM CHLORIDE 0.9 % (FLUSH) 0.9 %
3-10 SYRINGE (ML) INJECTION AS NEEDED
Status: DISCONTINUED | OUTPATIENT
Start: 2023-01-25 | End: 2023-01-25 | Stop reason: HOSPADM

## 2023-01-25 RX ORDER — DEXAMETHASONE SODIUM PHOSPHATE 4 MG/ML
INJECTION, SOLUTION INTRA-ARTICULAR; INTRALESIONAL; INTRAMUSCULAR; INTRAVENOUS; SOFT TISSUE AS NEEDED
Status: DISCONTINUED | OUTPATIENT
Start: 2023-01-25 | End: 2023-01-25 | Stop reason: SURG

## 2023-01-25 RX ORDER — SODIUM CHLORIDE 9 MG/ML
40 INJECTION, SOLUTION INTRAVENOUS AS NEEDED
Status: DISCONTINUED | OUTPATIENT
Start: 2023-01-25 | End: 2023-01-25 | Stop reason: HOSPADM

## 2023-01-25 RX ORDER — SODIUM CHLORIDE, SODIUM LACTATE, POTASSIUM CHLORIDE, CALCIUM CHLORIDE 600; 310; 30; 20 MG/100ML; MG/100ML; MG/100ML; MG/100ML
1000 INJECTION, SOLUTION INTRAVENOUS CONTINUOUS
Status: DISCONTINUED | OUTPATIENT
Start: 2023-01-25 | End: 2023-01-25 | Stop reason: HOSPADM

## 2023-01-25 RX ORDER — LIDOCAINE HYDROCHLORIDE 20 MG/ML
INJECTION, SOLUTION EPIDURAL; INFILTRATION; INTRACAUDAL; PERINEURAL AS NEEDED
Status: DISCONTINUED | OUTPATIENT
Start: 2023-01-25 | End: 2023-01-25 | Stop reason: SURG

## 2023-01-25 RX ORDER — ONDANSETRON 2 MG/ML
INJECTION INTRAMUSCULAR; INTRAVENOUS AS NEEDED
Status: DISCONTINUED | OUTPATIENT
Start: 2023-01-25 | End: 2023-01-25 | Stop reason: SURG

## 2023-01-25 RX ORDER — SODIUM CHLORIDE, SODIUM LACTATE, POTASSIUM CHLORIDE, CALCIUM CHLORIDE 600; 310; 30; 20 MG/100ML; MG/100ML; MG/100ML; MG/100ML
INJECTION, SOLUTION INTRAVENOUS CONTINUOUS PRN
Status: DISCONTINUED | OUTPATIENT
Start: 2023-01-25 | End: 2023-01-25 | Stop reason: SURG

## 2023-01-25 RX ORDER — SODIUM CHLORIDE 9 MG/ML
100 INJECTION, SOLUTION INTRAVENOUS CONTINUOUS
Status: DISCONTINUED | OUTPATIENT
Start: 2023-01-25 | End: 2023-01-25 | Stop reason: HOSPADM

## 2023-01-25 RX ORDER — HYDROCODONE BITARTRATE AND ACETAMINOPHEN 7.5; 325 MG/1; MG/1
1 TABLET ORAL ONCE AS NEEDED
Status: COMPLETED | OUTPATIENT
Start: 2023-01-25 | End: 2023-01-25

## 2023-01-25 RX ADMIN — ROCURONIUM BROMIDE 50 MG: 10 INJECTION, SOLUTION INTRAVENOUS at 08:38

## 2023-01-25 RX ADMIN — Medication 100 MCG: at 08:49

## 2023-01-25 RX ADMIN — LIDOCAINE HYDROCHLORIDE 60 MG: 20 INJECTION, SOLUTION EPIDURAL; INFILTRATION; INTRACAUDAL; PERINEURAL at 08:38

## 2023-01-25 RX ADMIN — SODIUM CHLORIDE, POTASSIUM CHLORIDE, SODIUM LACTATE AND CALCIUM CHLORIDE 1000 ML: 600; 310; 30; 20 INJECTION, SOLUTION INTRAVENOUS at 07:32

## 2023-01-25 RX ADMIN — ONDANSETRON 8 MG: 2 INJECTION INTRAMUSCULAR; INTRAVENOUS at 08:42

## 2023-01-25 RX ADMIN — FENTANYL CITRATE 50 MCG: 50 INJECTION, SOLUTION INTRAMUSCULAR; INTRAVENOUS at 08:38

## 2023-01-25 RX ADMIN — HYDROMORPHONE HYDROCHLORIDE 0.5 MG: 1 INJECTION, SOLUTION INTRAMUSCULAR; INTRAVENOUS; SUBCUTANEOUS at 10:27

## 2023-01-25 RX ADMIN — DEXAMETHASONE SODIUM PHOSPHATE 4 MG: 4 INJECTION, SOLUTION INTRAMUSCULAR; INTRAVENOUS at 08:41

## 2023-01-25 RX ADMIN — SUGAMMADEX 200 MG: 100 INJECTION, SOLUTION INTRAVENOUS at 09:28

## 2023-01-25 RX ADMIN — FENTANYL CITRATE 50 MCG: 50 INJECTION, SOLUTION INTRAMUSCULAR; INTRAVENOUS at 08:55

## 2023-01-25 RX ADMIN — SUGAMMADEX 200 MG: 100 INJECTION, SOLUTION INTRAVENOUS at 09:31

## 2023-01-25 RX ADMIN — SODIUM CHLORIDE, SODIUM LACTATE, POTASSIUM CHLORIDE, AND CALCIUM CHLORIDE: .6; .31; .03; .02 INJECTION, SOLUTION INTRAVENOUS at 08:39

## 2023-01-25 RX ADMIN — CLINDAMYCIN PHOSPHATE 600 MG: 600 INJECTION, SOLUTION INTRAVENOUS at 08:38

## 2023-01-25 RX ADMIN — PROPOFOL 200 MG: 10 INJECTION, EMULSION INTRAVENOUS at 08:38

## 2023-01-25 RX ADMIN — Medication 100 MCG: at 08:44

## 2023-01-25 RX ADMIN — HYDROCODONE BITARTRATE AND ACETAMINOPHEN 1 TABLET: 7.5; 325 TABLET ORAL at 10:55

## 2023-01-25 NOTE — OP NOTE
Operative Report:    Patient Name:  Rachell Walker  YOB: 1990    Date of Surgery:  1/25/2023     Indications: 32-year-old lady referred to me for evaluation and management of his presumed to be symptomatic cholelithiasis.  I counseled her about the diagnosis the treatment options and after discussing the risk and benefits she understood the risk was willing to proceed with cholecystectomy.  There was a history of a mild transaminitis for that reason I had talk to her about attempting a cholangiogram.     Pre-op Diagnosis:   Symptomatic cholelithiasis [K80.20]       Post-Op Diagnosis Codes:     * Symptomatic cholelithiasis [K80.20]    Procedure/CPT® Codes:      Procedure(s):  CHOLECYSTECTOMY LAPAROSCOPIC INTRAOPERATIVE CHOLANGIOGRAM    Staff:  Surgeon(s):  Ezequiel Flores MD    Circulator: Analy Trejo RN; Addison Cheatham RN  Radiology Technologist: OlgaL idia West  Scrub Person: Pat Luke  Assistant: Oswaldo Wynn CSFA  was responsible for performing the following activities: Retraction, Closing and Held/Positioned Camera and their skilled assistance was necessary for the success of this case.        Anesthesia: General    Estimated Blood Loss: minimal    Implants:    Nothing was implanted during the procedure    Specimen:          Specimens     ID Source Type Tests Collected By Collected At Frozen?    A Gallbladder Tissue · TISSUE PATHOLOGY EXAM   Ezequiel Flores MD 1/25/23 0916               Findings: Completely collapsed gallbladder.  Intraoperative cholangiogram without passage of contrast out of the gallbladder.     Complications: None    Description of Procedure: Patient is taken to the operating placed in the operating table in supine position and general anesthesia.  Her abdomen was prepped draped normal sterile fashion.  Timeouts performed identifying correct patient receiving site of operation.  I began the operation Mitran the abdomen through a left upper  quadrant 5 mm optical trocar entry.  Upon entry the abdominal cavity abdomen was fully insufflated and inspected there is no evidence of injury to underlying structures.  She was placed in reverse Trendelenburg with right side up.  A periumbilical 12 mm port and 2 right subcostal 5 mm ports were placed under laparoscopic guidance.  Gallbladder was identified and retractors right upper quadrant.  It was completely devoid of all bile.  The cystic duct and cystic artery were well visualized and were dissected free from surrounding tissues.  The gallbladder was dissected free off the base of the liver revealed the critical view.  I then placed the Oneil clamp across the infundibulum and attempted the cholangiogram there was no passage through the cystic duct.  I then used the [place] dissector to try to evacuate the contents of the cystic duct repeated the cholangiogram without passage.  I then moved the Oneil clamp back onto the body of the gallbladder and injected contrast and it seemed like the gallbladder filled cystic duct did not empty the gallbladder.  At that point this portion of the operation was aborted.  2 clips were placed on the cystic duct and the cystic artery and then they were divided.  The gallbladder was dissected off the liver edge without any significant bleeding or bile spillage.  Was placed Endo Catch bag and removed.  The right upper quadrant was inspected hemostasis was achieved.  A Ray-Sabina was introduced to evacuate some minor bleeding that occurred during the cholangiogram as well as to evacuate any spilled contrast.  Ray-Sabina was then removed.  The ports were serially moved out any abdominal hemorrhage.  The 12 mm port was closed with 0 Vicryl suture and suture passer.  Abdomen was desufflated skin was closed with 4-0 Vicryl suture and skin affix.  She tolerated seizure well transferred recovery in satisfactory condition the counts were correct at the end the case.      Ezequiel Flores MD      Date: 1/25/2023  Time: 09:30 EST    This note was created using Dragon Voice Recognition software.

## 2023-01-25 NOTE — ANESTHESIA PREPROCEDURE EVALUATION
Anesthesia Evaluation     Patient summary reviewed and Nursing notes reviewed   NPO Solid Status: > 8 hours  NPO Liquid Status: > 8 hours           Airway   Mallampati: II  TM distance: >3 FB  Neck ROM: full  Narrow palate and Small opening  Dental      Pulmonary    Cardiovascular   Exercise tolerance: good (4-7 METS)        Neuro/Psych  (+) psychiatric history Anxiety and Depression,    GI/Hepatic/Renal/Endo      Musculoskeletal     Abdominal    Substance History      OB/GYN          Other                        Anesthesia Plan    ASA 2     general     intravenous induction     Anesthetic plan, risks, benefits, and alternatives have been provided, discussed and informed consent has been obtained with: patient.        CODE STATUS:

## 2023-01-25 NOTE — ANESTHESIA POSTPROCEDURE EVALUATION
Patient: Rachell Walker    Procedure Summary     Date: 01/25/23 Room / Location: [facility] OR 06 / [facility] MAIN OR    Anesthesia Start: 0828 Anesthesia Stop: 0942    Procedure: CHOLECYSTECTOMY LAPAROSCOPIC INTRAOPERATIVE CHOLANGIOGRAM (Abdomen) Diagnosis:       Symptomatic cholelithiasis      (Symptomatic cholelithiasis [K80.20])    Surgeons: Ezequiel Flores MD Provider: Efe Johnson MD    Anesthesia Type: general ASA Status: 2          Anesthesia Type: general    Vitals  Vitals Value Taken Time   /66 01/25/23 1059   Temp 98.4 °F (36.9 °C) 01/25/23 1059   Pulse 82 01/25/23 1059   Resp 12 01/25/23 1059   SpO2 100 % 01/25/23 1059           Post Anesthesia Care and Evaluation    Patient location during evaluation: PACU  Patient participation: complete - patient participated  Level of consciousness: awake  Pain scale: See nurse's notes for pain score.  Pain management: adequate    Airway patency: patent  Anesthetic complications: No anesthetic complications  PONV Status: none  Cardiovascular status: acceptable  Respiratory status: acceptable  Hydration status: acceptable    Comments: Patient seen and examined postoperatively; vital signs stable; SpO2 greater than or equal to 90%; cardiopulmonary status stable; nausea/vomiting adequately controlled; pain adequately controlled; no apparent anesthesia complications; patient discharged from anesthesia care when discharge criteria were met

## 2023-01-25 NOTE — ANESTHESIA PROCEDURE NOTES
Airway  Urgency: elective    Date/Time: 1/25/2023 8:38 AM  Airway not difficult    General Information and Staff    Patient location during procedure: OR    Indications and Patient Condition  Indications for airway management: airway protection    Preoxygenated: yes  MILS maintained throughout  Mask difficulty assessment: 1 - vent by mask    Final Airway Details  Final airway type: endotracheal airway      Successful airway: ETT  Cuffed: yes   Successful intubation technique: direct laryngoscopy  Facilitating devices/methods: intubating stylet  Endotracheal tube insertion site: oral  Blade: Andrei  Blade size: 3  ETT size (mm): 7.5  Cormack-Lehane Classification: grade I - full view of glottis  Placement verified by: chest auscultation   Cuff volume (mL): 5  Measured from: lips  Number of attempts at approach: 1  Assessment: lips, teeth, and gum same as pre-op

## 2023-01-26 LAB
LAB AP CASE REPORT: NORMAL
PATH REPORT.FINAL DX SPEC: NORMAL
PATH REPORT.GROSS SPEC: NORMAL

## 2023-02-09 ENCOUNTER — TELEPHONE (OUTPATIENT)
Dept: SURGERY | Facility: CLINIC | Age: 33
End: 2023-02-09

## 2023-02-09 NOTE — TELEPHONE ENCOUNTER
SAME DAY CANCEL APPT    Caller: JORDAN [facility]    Relationship to patient: SELF    Best call back number: [phone]    Patient is needing: SAME DAY CANCEL APPT; WILL CALLBACK TO R/S

## 2023-02-28 ENCOUNTER — OFFICE VISIT (OUTPATIENT)
Dept: SURGERY | Facility: CLINIC | Age: 33
End: 2023-02-28
Payer: COMMERCIAL

## 2023-02-28 VITALS
TEMPERATURE: 98 F | HEIGHT: 63 IN | OXYGEN SATURATION: 99 % | HEART RATE: 76 BPM | WEIGHT: 127 LBS | SYSTOLIC BLOOD PRESSURE: 92 MMHG | DIASTOLIC BLOOD PRESSURE: 64 MMHG | BODY MASS INDEX: 22.5 KG/M2 | RESPIRATION RATE: 16 BRPM

## 2023-02-28 DIAGNOSIS — K80.20 SYMPTOMATIC CHOLELITHIASIS: Primary | ICD-10-CM

## 2023-02-28 PROCEDURE — 99024 POSTOP FOLLOW-UP VISIT: CPT | Performed by: SURGERY

## 2023-02-28 NOTE — PROGRESS NOTES
"Subjective   Rachell Walker is a 32 y.o. female.   A couple weeks out from laparoscopic cholecystectomy with attempted cholangiogram.  There was really no significant bile in the gallbladder completely collapsed and I could not get any contrast to leave the gallbladder itself.  She has done pretty well since the operation says that she has not had any severe upper abdominal pain when she eats.  She had moderate right-sided abdominal pain that was by whenever she coughed or sneezed but has gradually improved.  She is having baseline bowel function without any severe diarrhea.  No significant food triggers are avoidance at this point.    Objective   BP 92/64 (BP Location: Left arm, Patient Position: Sitting, Cuff Size: Adult)   Pulse 76   Temp 98 °F (36.7 °C) (Infrared)   Resp 16   Ht 160 cm (63\")   Wt 57.6 kg (127 lb)   SpO2 99%   BMI 22.50 kg/m²   Physical Exam  Exam abdomen soft nondistended minimally tender to palpation around the incisions no evidence of hernia or infection.  Assessment & Plan   Diagnoses and all orders for this visit:    1. Symptomatic cholelithiasis (Primary)    Status post laparoscopic cholecystectomy for symptomatic cholelithiasis.  Intraoperative cholangiogram attempted but could not be obtained due to occlusion of the cystic duct.  I counseled her about symptoms in the future if she develops any severe abdominal pain recurrent symptoms jaundice or deep discolored urine she would need to be evaluated for common bile duct stones.  This likely would include lab work and MRI.   her LFTs immediately prior to the operation were normal.  For now okay to increase activity as tolerated and follow-up as needed.  She will have ongoing follow-up with gastroenterology    Ezequiel Flores MD  2/28/2023  9:15 AM EST    This note was created using Dragon Voice Recognition software.  "

## 2023-03-22 RX ORDER — PLECANATIDE 3 MG/1
3 TABLET ORAL DAILY
Qty: 90 TABLET | Refills: 0 | OUTPATIENT
Start: 2023-03-22

## 2023-04-28 ENCOUNTER — OFFICE VISIT (OUTPATIENT)
Dept: GASTROENTEROLOGY | Facility: CLINIC | Age: 33
End: 2023-04-28
Payer: COMMERCIAL

## 2023-04-28 VITALS
SYSTOLIC BLOOD PRESSURE: 100 MMHG | DIASTOLIC BLOOD PRESSURE: 60 MMHG | WEIGHT: 123 LBS | TEMPERATURE: 97 F | BODY MASS INDEX: 21.79 KG/M2 | HEIGHT: 63 IN | HEART RATE: 71 BPM | OXYGEN SATURATION: 98 %

## 2023-04-28 DIAGNOSIS — Z90.49 HISTORY OF CHOLECYSTECTOMY: ICD-10-CM

## 2023-04-28 DIAGNOSIS — K59.00 CONSTIPATION, UNSPECIFIED CONSTIPATION TYPE: Primary | Chronic | ICD-10-CM

## 2023-04-28 PROCEDURE — 99214 OFFICE O/P EST MOD 30 MIN: CPT | Performed by: NURSE PRACTITIONER

## 2023-04-28 NOTE — PROGRESS NOTES
"Chief Complaint   Patient presents with   • Constipation         History of Present Illness  33-year-old female presents the office today for follow-up.  She was last seen in office on 7/26/2021.  She has a history of constipation and currently treats with Trulance.  Her previous treatments have included MiraLAX and milk of magnesia as well as enemas, which were not efficacious. She has tried stool softeners in the past. 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     She takes Trulance, but not every day. Trulance is causing her to have pure liquid stools. She is taking the medication every few days. She is unable to take the medication if she is busy as she will have diarrhea. She reports having BMs every few days. Linzess was previously prescribed at the 72 mcg dosing strength but there were issues with insurance approval.  She denies any rectal bleeding or abdominal pain.    She reports having a cholecystectomy about 1.5 months ago. She has not seen any changes in her BMs since surgery.     Review of Systems   Constitutional: Negative for fever and unexpected weight change.   HENT: Negative for trouble swallowing.    Cardiovascular: Negative for chest pain.   Gastrointestinal: Positive for constipation. Negative for abdominal distention, abdominal pain, anal bleeding, blood in stool, diarrhea, nausea, rectal pain and vomiting.      Result Review :       Office Visit with Zaina Mcneil APRN (07/06/2021)  CT Abdomen Pelvis With Contrast (12/29/2022 15:46)  Tissue Pathology Exam (01/25/2023 09:16)    Vital Signs:   /60   Pulse 71   Temp 97 °F (36.1 °C)   Ht 160 cm (62.99\")   Wt 55.8 kg (123 lb)   SpO2 98%   BMI 21.79 kg/m²     Body mass index is 21.79 kg/m².     Physical Exam  Vitals reviewed.   Constitutional:       Appearance: Normal appearance.   Pulmonary:      Effort: " Pulmonary effort is normal. No respiratory distress.   Abdominal:      General: Abdomen is flat. Bowel sounds are normal. There is no distension.      Palpations: Abdomen is soft. There is no mass.      Tenderness: There is no abdominal tenderness. There is no guarding.   Musculoskeletal:         General: Normal range of motion.   Skin:     General: Skin is warm and dry.   Neurological:      General: No focal deficit present.      Mental Status: She is alert and oriented to person, place, and time.   Psychiatric:         Mood and Affect: Mood normal.         Behavior: Behavior normal.         Thought Content: Thought content normal.         Judgment: Judgment normal.       Assessment and Plan    Diagnoses and all orders for this visit:    1. Constipation, unspecified constipation type (Primary)    2. History of cholecystectomy    Other orders  -     linaclotide (Linzess) 72 MCG capsule capsule; Take 1 capsule by mouth Every Morning Before Breakfast.  Dispense: 20 capsule; Refill: 0           Patient Instructions   1. Discontinue Trulance.     2.  We have provided you with sample of Linzess 72 mcg tablets. Please contact our office and let us know how well the mediation is working for you. If it causes diarrhea, discontinue use and we will prescribe an alternative therapy. If it is ineffective, we will likely need to increase the dosing.     3.  Plan for office follow up in 6 months for reassessment of symptoms or sooner should symptoms worsen or fail to improve.      Discussion:     Trulance is working too well for the patient and is causing pure watery diarrhea so we will have her discontinue use.  She previously took Linzess at 72 mcg but cannot recall her response, however there was an insurance issue because she had not previously tried any other prescriptive medications.  We provided her with samples of Linzess 72 mcg for her to take once daily to see how well she responds and have recommended that she contact  me directly via [employer] with an update of her symptoms in the next couple of weeks.  If Linzess works well we will send in a prescription.  If Linzess is ineffective we can either increase the dose or make a change in therapy.  To consider either use of lactulose or Amitiza in the future if there are issues with Linzess.  Plan for office follow-up in 6 months for reassessment of symptoms, or sooner should her symptoms worsen or fail to improve.  Patient verbalized understanding of above plan of care and is in agreement.  All questions answered and support provided.  EMR Dragon/Transcription Disclaimer:  This document has been Dictated utilizing Dragon dictation.

## 2023-04-28 NOTE — PATIENT INSTRUCTIONS
Discontinue Trulance.     2.  We have provided you with sample of Linzess 72 mcg tablets. Please contact our office and let us know how well the mediation is working for you. If it causes diarrhea, discontinue use and we will prescribe an alternative therapy. If it is ineffective, we will likely need to increase the dosing.     3.  Plan for office follow up in 6 months for reassessment of symptoms or sooner should symptoms worsen or fail to improve.

## 2023-08-18 ENCOUNTER — APPOINTMENT (OUTPATIENT)
Dept: GENERAL RADIOLOGY | Facility: HOSPITAL | Age: 33
End: 2023-08-18
Payer: COMMERCIAL

## 2023-08-18 ENCOUNTER — APPOINTMENT (OUTPATIENT)
Dept: RESPIRATORY THERAPY | Facility: HOSPITAL | Age: 33
End: 2023-08-18
Payer: COMMERCIAL

## 2023-08-18 ENCOUNTER — HOSPITAL ENCOUNTER (EMERGENCY)
Facility: HOSPITAL | Age: 33
Discharge: HOME | End: 2023-08-18
Attending: EMERGENCY MEDICINE
Payer: COMMERCIAL

## 2023-08-18 VITALS
BODY MASS INDEX: 21.88 KG/M2 | OXYGEN SATURATION: 97 % | DIASTOLIC BLOOD PRESSURE: 75 MMHG | WEIGHT: 123.46 LBS | HEIGHT: 63 IN | TEMPERATURE: 98.8 F | SYSTOLIC BLOOD PRESSURE: 101 MMHG | HEART RATE: 77 BPM | RESPIRATION RATE: 16 BRPM

## 2023-08-18 DIAGNOSIS — M25.50 ARTHRALGIA, UNSPECIFIED JOINT: ICD-10-CM

## 2023-08-18 DIAGNOSIS — R00.2 PALPITATIONS: Primary | ICD-10-CM

## 2023-08-18 LAB
ALBUMIN SERPL-MCNC: 4.1 G/DL (ref 3.5–5.2)
ALBUMIN/GLOB SERPL: 1.6 G/DL
ALP SERPL-CCNC: 42 U/L (ref 39–117)
ALT SERPL W P-5'-P-CCNC: 7 U/L (ref 1–33)
ANION GAP SERPL CALCULATED.3IONS-SCNC: 11 MMOL/L (ref 5–15)
AST SERPL-CCNC: 13 U/L (ref 1–32)
BASOPHILS # BLD AUTO: 0.1 10*3/MM3 (ref 0–0.2)
BASOPHILS NFR BLD AUTO: 0.8 % (ref 0–1.5)
BILIRUB SERPL-MCNC: 0.2 MG/DL (ref 0–1.2)
BUN SERPL-MCNC: 8 MG/DL (ref 6–20)
BUN/CREAT SERPL: 15.1 (ref 7–25)
CALCIUM SPEC-SCNC: 8.5 MG/DL (ref 8.6–10.5)
CHLORIDE SERPL-SCNC: 107 MMOL/L (ref 98–107)
CO2 SERPL-SCNC: 21 MMOL/L (ref 22–29)
CREAT SERPL-MCNC: 0.53 MG/DL (ref 0.57–1)
DEPRECATED RDW RBC AUTO: 40.7 FL (ref 37–54)
EGFRCR SERPLBLD CKD-EPI 2021: 125.4 ML/MIN/1.73
EOSINOPHIL # BLD AUTO: 0.2 10*3/MM3 (ref 0–0.4)
EOSINOPHIL NFR BLD AUTO: 2.4 % (ref 0.3–6.2)
ERYTHROCYTE [DISTWIDTH] IN BLOOD BY AUTOMATED COUNT: 12.9 % (ref 12.3–15.4)
GLOBULIN UR ELPH-MCNC: 2.6 GM/DL
GLUCOSE SERPL-MCNC: 106 MG/DL (ref 65–99)
HCT VFR BLD AUTO: 35.5 % (ref 34–46.6)
HGB BLD-MCNC: 11.8 G/DL (ref 12–15.9)
LYMPHOCYTES # BLD AUTO: 1.5 10*3/MM3 (ref 0.7–3.1)
LYMPHOCYTES NFR BLD AUTO: 24 % (ref 19.6–45.3)
MAGNESIUM SERPL-MCNC: 1.9 MG/DL (ref 1.6–2.6)
MCH RBC QN AUTO: 30.1 PG (ref 26.6–33)
MCHC RBC AUTO-ENTMCNC: 33.2 G/DL (ref 31.5–35.7)
MCV RBC AUTO: 90.8 FL (ref 79–97)
MONOCYTES # BLD AUTO: 0.6 10*3/MM3 (ref 0.1–0.9)
MONOCYTES NFR BLD AUTO: 8.8 % (ref 5–12)
NEUTROPHILS NFR BLD AUTO: 4 10*3/MM3 (ref 1.7–7)
NEUTROPHILS NFR BLD AUTO: 64 % (ref 42.7–76)
NRBC BLD AUTO-RTO: 0.1 /100 WBC (ref 0–0.2)
NT-PROBNP SERPL-MCNC: 38.1 PG/ML (ref 0–450)
PLATELET # BLD AUTO: 270 10*3/MM3 (ref 140–450)
PMV BLD AUTO: 7.5 FL (ref 6–12)
POTASSIUM SERPL-SCNC: 3.7 MMOL/L (ref 3.5–5.2)
PROT SERPL-MCNC: 6.7 G/DL (ref 6–8.5)
RBC # BLD AUTO: 3.92 10*6/MM3 (ref 3.77–5.28)
SODIUM SERPL-SCNC: 139 MMOL/L (ref 136–145)
TROPONIN T SERPL HS-MCNC: <6 NG/L
TSH SERPL DL<=0.05 MIU/L-ACNC: 0.91 UIU/ML (ref 0.27–4.2)
WBC NRBC COR # BLD: 6.3 10*3/MM3 (ref 3.4–10.8)

## 2023-08-18 PROCEDURE — 85025 COMPLETE CBC W/AUTO DIFF WBC: CPT | Performed by: NURSE PRACTITIONER

## 2023-08-18 PROCEDURE — 99284 EMERGENCY DEPT VISIT MOD MDM: CPT

## 2023-08-18 PROCEDURE — 71045 X-RAY EXAM CHEST 1 VIEW: CPT

## 2023-08-18 PROCEDURE — 80053 COMPREHEN METABOLIC PANEL: CPT | Performed by: NURSE PRACTITIONER

## 2023-08-18 PROCEDURE — 83880 ASSAY OF NATRIURETIC PEPTIDE: CPT | Performed by: NURSE PRACTITIONER

## 2023-08-18 PROCEDURE — 84443 ASSAY THYROID STIM HORMONE: CPT | Performed by: NURSE PRACTITIONER

## 2023-08-18 PROCEDURE — 84484 ASSAY OF TROPONIN QUANT: CPT | Performed by: NURSE PRACTITIONER

## 2023-08-18 PROCEDURE — 93005 ELECTROCARDIOGRAM TRACING: CPT

## 2023-08-18 PROCEDURE — 83735 ASSAY OF MAGNESIUM: CPT | Performed by: NURSE PRACTITIONER

## 2023-08-18 RX ORDER — SODIUM CHLORIDE 0.9 % (FLUSH) 0.9 %
10 SYRINGE (ML) INJECTION AS NEEDED
Status: DISCONTINUED | OUTPATIENT
Start: 2023-08-18 | End: 2023-08-18 | Stop reason: HOSPADM

## 2023-08-18 RX ADMIN — SODIUM CHLORIDE, POTASSIUM CHLORIDE, SODIUM LACTATE AND CALCIUM CHLORIDE 1000 ML: 600; 310; 30; 20 INJECTION, SOLUTION INTRAVENOUS at 16:36

## 2023-08-18 NOTE — ED NOTES
Pt states that a week and half ago she started having full body aches. Today her pain in her neck, shoulders, and upper back. Pt states that a few days ago she noticed she was having an increased heart rate at 122 bpm. Pt states she has been having SOB and left anterior chest pain today. Pt states she was in the car when the chest pain started and has also been feeling lightheaded. Pt was seen by her PCP on Wednesday and was tested for respiratory illness and autoimmune disorder and both came back negative.

## 2023-08-18 NOTE — ED PROVIDER NOTES
Subjective   History of Present Illness  Patient is a 33-year-old female who states that she has a cardiologist in [place] as well as her primary care provider who sent her to the emergency room today because she had continued tachycardia stating that it was in the 120s she denies any fever chills cough or congestion she states she had an echo about a year ago it was normal she states that the palpitations today lasted for about 10 minutes and she did not have any chest pain or shortness of breath or diaphoresis associated with it.    Review of Systems   Constitutional:  Negative for chills, fatigue and fever.   HENT:  Negative for congestion, tinnitus and trouble swallowing.    Eyes:  Negative for photophobia, discharge and redness.   Respiratory:  Negative for cough and shortness of breath.    Cardiovascular:  Positive for palpitations. Negative for chest pain.   Gastrointestinal:  Negative for abdominal pain, diarrhea, nausea and vomiting.   Genitourinary:  Negative for dysuria, frequency and urgency.   Musculoskeletal:  Negative for back pain, joint swelling and myalgias.   Skin:  Negative for rash.   Neurological:  Negative for dizziness and headaches.   Psychiatric/Behavioral:  Negative for confusion.    All other systems reviewed and are negative.    Past Medical History:   Diagnosis Date    Anemia     Anesthesia complication     tachycardia post-op unsure from anesthesia or surgery    Anorexia     Anxiety and depression     Cholelithiasis     IBS (irritable bowel syndrome)     Insomnia     OCD (obsessive compulsive disorder)        Allergies   Allergen Reactions    Penicillins Swelling     Swelling of airway and ithing       Past Surgical History:   Procedure Laterality Date    CHOLECYSTECTOMY WITH INTRAOPERATIVE CHOLANGIOGRAM N/A 1/25/2023    Procedure: CHOLECYSTECTOMY LAPAROSCOPIC INTRAOPERATIVE CHOLANGIOGRAM;  Surgeon: Ezequiel Flores MD;  Location: [facility] MAIN OR;  Service: General;  Laterality: N/A;     COLONOSCOPY      CYST REMOVAL Right     on fallopian tube    TUMOR EXCISION Left     benign       Family History   Problem Relation Age of Onset    Cancer Mother     Brain cancer Mother     Kidney disease Father     Colon cancer Neg Hx     Colon polyps Neg Hx     Irritable bowel syndrome Neg Hx     Ulcerative colitis Neg Hx     Crohn's disease Neg Hx        Social History     Socioeconomic History    Marital status:    Tobacco Use    Smoking status: Never     Passive exposure: Never    Smokeless tobacco: Never   Vaping Use    Vaping Use: Never used   Substance and Sexual Activity    Alcohol use: Yes     Comment: socially    Drug use: Never    Sexual activity: Defer           Objective   Physical Exam  Vitals reviewed.   Constitutional:       Appearance: She is well-developed.   HENT:      Head: Normocephalic and atraumatic.   Eyes:      Conjunctiva/sclera: Conjunctivae normal.      Pupils: Pupils are equal, round, and reactive to light.   Cardiovascular:      Rate and Rhythm: Normal rate and regular rhythm.      Heart sounds: Normal heart sounds.   Pulmonary:      Effort: Pulmonary effort is normal. No respiratory distress.      Breath sounds: Normal breath sounds. No wheezing.   Abdominal:      General: Bowel sounds are normal. There is no distension.      Palpations: Abdomen is soft. There is no mass.      Tenderness: There is no abdominal tenderness. There is no guarding or rebound.   Musculoskeletal:         General: No deformity. Normal range of motion.      Cervical back: Normal range of motion and neck supple.   Skin:     General: Skin is warm and dry.      Capillary Refill: Capillary refill takes less than 2 seconds.   Neurological:      General: No focal deficit present.      Mental Status: She is alert and oriented to person, place, and time.      GCS: GCS eye subscore is 4. GCS verbal subscore is 5. GCS motor subscore is 6.      Cranial Nerves: No cranial nerve deficit.      Sensory: No sensory  "deficit.      Deep Tendon Reflexes: Reflexes normal.   Psychiatric:         Mood and Affect: Mood normal.         Behavior: Behavior normal.       Procedures           EKG was obtained and reviewed and interpreted by myself as well as Dr. Carter as sinus rhythm with a rate of 87 no ectopy no ST elevation no previous for comparison  ED Course      /75   Pulse 77   Temp 98.8 øF (37.1 øC) (Oral)   Resp 16   Ht 160 cm (63\")   Wt 56 kg (123 lb 7.3 oz)   SpO2 97%   BMI 21.87 kg/mý   Labs Reviewed   COMPREHENSIVE METABOLIC PANEL - Abnormal; Notable for the following components:       Result Value    Glucose 106 (*)     Creatinine 0.53 (*)     CO2 21.0 (*)     Calcium 8.5 (*)     All other components within normal limits    Narrative:     GFR Normal >60  Chronic Kidney Disease <60  Kidney Failure <15     CBC WITH AUTO DIFFERENTIAL - Abnormal; Notable for the following components:    Hemoglobin 11.8 (*)     All other components within normal limits   BNP (IN-HOUSE) - Normal    Narrative:     Among patients with dyspnea, NT-proBNP is highly sensitive for the detection of acute congestive heart failure. In addition NT-proBNP of <300 pg/ml effectively rules out acute congestive heart failure with 99% negative predictive value.     SINGLE HSTROPONIN T - Normal    Narrative:     High Sensitive Troponin T Reference Range:  <10.0 ng/L- Negative Female for AMI  <15.0 ng/L- Negative Male for AMI  >=10 - Abnormal Female indicating possible myocardial injury.  >=15 - Abnormal Male indicating possible myocardial injury.   Clinicians would have to utilize clinical acumen, EKG, Troponin, and serial changes to determine if it is an Acute Myocardial Infarction or myocardial injury due to an underlying chronic condition.        TSH - Normal   MAGNESIUM - Normal   CBC AND DIFFERENTIAL    Narrative:     The following orders were created for panel order CBC & Differential.  Procedure                               Abnormality        "  Status                     ---------                               -----------         ------                     CBC Auto Differential[[id number]]        Abnormal            Final result                 Please view results for these tests on the individual orders.     Medications   lactated ringers bolus 1,000 mL (0 mL Intravenous Stopped 8/18/23 1812)     XR Chest 1 View    Result Date: 8/18/2023  Impression: 1. Small bilateral pleural effusions, left greater than right. Electronically Signed: Liu Coates MD  8/18/2023 4:50 PM EDT  Workstation ID: [id number]                                        Medical Decision Making  Patient is a 33-year-old female who presented with palpitations and body aches and pains which is concerning for viral syndrome electrolyte imbalance acute coronary syndrome or other symptoms.  This is not an all-inclusive list.  Patient had above exam IV was established blood work was obtained and interpreted by myself and found to have normal blood work normal EKG and normal chest x-ray as interpreted by myself.  The patient was placed in an M cot monitor which she will wear for 1 month she will follow-up with the cardiologist she was agreeable this plan of care patient verbalized understood discharge instructions need to return for any worsening symptoms to continue to follow the instructions of cardiology to have the cardiac monitor turned in and read appropriately and follow-up with cardiology for the results patient verbalized understood discharge instruction    Problems Addressed:  Arthralgia, unspecified joint: complicated acute illness or injury  Palpitations: complicated acute illness or injury    Amount and/or Complexity of Data Reviewed  Labs: ordered. Decision-making details documented in ED Course.  Radiology: ordered and independent interpretation performed. Decision-making details documented in ED Course.  ECG/medicine tests: ordered and independent interpretation performed.  Decision-making details documented in ED Course.    Risk  OTC drugs.  Prescription drug management.        Final diagnoses:   Palpitations   Arthralgia, unspecified joint       ED Disposition  ED Disposition       ED Disposition   Discharge    Condition   Stable    Comment   --               Jose Antonio Patricia MD  [address] [place] [address]  [phone]    In 3 days  for further cardiology work up    Cody Hannah MD  [address] [facility] [place] [address]  [phone]    In 3 days  If symptoms worsen, As needed         Medication List      No changes were made to your prescriptions during this visit.            Nuris Saucedo, APRN  08/19/23 1937

## 2023-08-18 NOTE — DISCHARGE INSTRUCTIONS
Push clear liquids    Wear the M cot and return to cardiology as directed    Follow-up with your primary care provider or cardiologist for further evaluation and treatment if symptoms are persistent    Thyroid level was normal today as well    Return if worse

## 2023-08-18 NOTE — Clinical Note
[facility] EMERGENCY DEPARTMENT  [address] [place] [address]  Phone: [phone]    Rachell Walker was seen and treated in our emergency department on 8/18/2023.  She may return to work on 08/19/2023.         Thank you for choosing [facility].    Criselda Hernández RN

## 2023-08-19 ENCOUNTER — APPOINTMENT (OUTPATIENT)
Dept: GENERAL RADIOLOGY | Facility: HOSPITAL | Age: 33
End: 2023-08-19
Payer: COMMERCIAL

## 2023-08-19 ENCOUNTER — HOSPITAL ENCOUNTER (OUTPATIENT)
Facility: HOSPITAL | Age: 33
Setting detail: OBSERVATION
Discharge: LEFT BEFORE DISCHARGE | End: 2023-08-19
Attending: EMERGENCY MEDICINE | Admitting: EMERGENCY MEDICINE
Payer: COMMERCIAL

## 2023-08-19 VITALS
WEIGHT: 122.8 LBS | DIASTOLIC BLOOD PRESSURE: 78 MMHG | HEART RATE: 82 BPM | RESPIRATION RATE: 14 BRPM | OXYGEN SATURATION: 99 % | BODY MASS INDEX: 21.76 KG/M2 | SYSTOLIC BLOOD PRESSURE: 106 MMHG | TEMPERATURE: 97.8 F | HEIGHT: 63 IN

## 2023-08-19 DIAGNOSIS — R07.9 CHEST PAIN, UNSPECIFIED TYPE: Primary | ICD-10-CM

## 2023-08-19 LAB
ALBUMIN SERPL-MCNC: 4.1 G/DL (ref 3.5–5.2)
ALBUMIN/GLOB SERPL: 1.5 G/DL
ALP SERPL-CCNC: 44 U/L (ref 39–117)
ALT SERPL W P-5'-P-CCNC: 7 U/L (ref 1–33)
ANION GAP SERPL CALCULATED.3IONS-SCNC: 11 MMOL/L (ref 5–15)
AST SERPL-CCNC: 12 U/L (ref 1–32)
BASOPHILS # BLD AUTO: 0.1 10*3/MM3 (ref 0–0.2)
BASOPHILS NFR BLD AUTO: 0.8 % (ref 0–1.5)
BILIRUB SERPL-MCNC: 0.2 MG/DL (ref 0–1.2)
BUN SERPL-MCNC: 11 MG/DL (ref 6–20)
BUN/CREAT SERPL: 16.7 (ref 7–25)
CALCIUM SPEC-SCNC: 9.2 MG/DL (ref 8.6–10.5)
CHLORIDE SERPL-SCNC: 105 MMOL/L (ref 98–107)
CO2 SERPL-SCNC: 25 MMOL/L (ref 22–29)
CREAT SERPL-MCNC: 0.66 MG/DL (ref 0.57–1)
DEPRECATED RDW RBC AUTO: 42.4 FL (ref 37–54)
EGFRCR SERPLBLD CKD-EPI 2021: 119 ML/MIN/1.73
EOSINOPHIL # BLD AUTO: 0.2 10*3/MM3 (ref 0–0.4)
EOSINOPHIL NFR BLD AUTO: 3 % (ref 0.3–6.2)
ERYTHROCYTE [DISTWIDTH] IN BLOOD BY AUTOMATED COUNT: 13.1 % (ref 12.3–15.4)
GLOBULIN UR ELPH-MCNC: 2.8 GM/DL
GLUCOSE SERPL-MCNC: 113 MG/DL (ref 65–99)
HCT VFR BLD AUTO: 34.4 % (ref 34–46.6)
HGB BLD-MCNC: 11.7 G/DL (ref 12–15.9)
LYMPHOCYTES # BLD AUTO: 2.1 10*3/MM3 (ref 0.7–3.1)
LYMPHOCYTES NFR BLD AUTO: 27.5 % (ref 19.6–45.3)
MCH RBC QN AUTO: 29.9 PG (ref 26.6–33)
MCHC RBC AUTO-ENTMCNC: 34 G/DL (ref 31.5–35.7)
MCV RBC AUTO: 87.8 FL (ref 79–97)
MONOCYTES # BLD AUTO: 0.5 10*3/MM3 (ref 0.1–0.9)
MONOCYTES NFR BLD AUTO: 7.2 % (ref 5–12)
NEUTROPHILS NFR BLD AUTO: 4.6 10*3/MM3 (ref 1.7–7)
NEUTROPHILS NFR BLD AUTO: 61.5 % (ref 42.7–76)
NRBC BLD AUTO-RTO: 0 /100 WBC (ref 0–0.2)
NT-PROBNP SERPL-MCNC: 37.6 PG/ML (ref 0–450)
PLATELET # BLD AUTO: 260 10*3/MM3 (ref 140–450)
PMV BLD AUTO: 7.6 FL (ref 6–12)
POTASSIUM SERPL-SCNC: 3.8 MMOL/L (ref 3.5–5.2)
PROT SERPL-MCNC: 6.9 G/DL (ref 6–8.5)
QT INTERVAL: 343 MS
RBC # BLD AUTO: 3.91 10*6/MM3 (ref 3.77–5.28)
SODIUM SERPL-SCNC: 141 MMOL/L (ref 136–145)
TROPONIN T SERPL HS-MCNC: <6 NG/L
WBC NRBC COR # BLD: 7.5 10*3/MM3 (ref 3.4–10.8)

## 2023-08-19 PROCEDURE — 80053 COMPREHEN METABOLIC PANEL: CPT | Performed by: NURSE PRACTITIONER

## 2023-08-19 PROCEDURE — 84484 ASSAY OF TROPONIN QUANT: CPT | Performed by: NURSE PRACTITIONER

## 2023-08-19 PROCEDURE — 85025 COMPLETE CBC W/AUTO DIFF WBC: CPT | Performed by: NURSE PRACTITIONER

## 2023-08-19 PROCEDURE — 83880 ASSAY OF NATRIURETIC PEPTIDE: CPT | Performed by: NURSE PRACTITIONER

## 2023-08-19 PROCEDURE — 71045 X-RAY EXAM CHEST 1 VIEW: CPT

## 2023-08-19 PROCEDURE — 99284 EMERGENCY DEPT VISIT MOD MDM: CPT

## 2023-08-19 PROCEDURE — G0378 HOSPITAL OBSERVATION PER HR: HCPCS

## 2023-08-19 PROCEDURE — 93005 ELECTROCARDIOGRAM TRACING: CPT

## 2023-08-19 RX ORDER — ASPIRIN 81 MG/1
324 TABLET, CHEWABLE ORAL ONCE
Status: COMPLETED | OUTPATIENT
Start: 2023-08-19 | End: 2023-08-19

## 2023-08-19 RX ORDER — SODIUM CHLORIDE 0.9 % (FLUSH) 0.9 %
10 SYRINGE (ML) INJECTION AS NEEDED
Status: DISCONTINUED | OUTPATIENT
Start: 2023-08-19 | End: 2023-08-20 | Stop reason: HOSPADM

## 2023-08-19 RX ADMIN — ASPIRIN 81 MG CHEWABLE TABLET 324 MG: 81 TABLET CHEWABLE at 22:10

## 2023-08-20 LAB — QT INTERVAL: 338 MS

## 2023-08-20 NOTE — ED PROVIDER NOTES
Subjective   History of Present Illness  Patient is a 33-year-old female who I saw last night for same complaint of chest pain the patient comes in tonight stating she has continued substernal chest pain she states that has not improved.  She is wearing the M cot that was placed last night.  She denies any shortness of breath cough congestion fever chills    Review of Systems   Constitutional:  Negative for chills, fatigue and fever.   HENT:  Negative for congestion, tinnitus and trouble swallowing.    Eyes:  Negative for photophobia, discharge and redness.   Respiratory:  Positive for chest tightness. Negative for cough and shortness of breath.    Cardiovascular:  Positive for chest pain. Negative for palpitations.   Gastrointestinal:  Negative for abdominal pain, diarrhea, nausea and vomiting.   Genitourinary:  Negative for dysuria, frequency and urgency.   Musculoskeletal:  Negative for back pain, joint swelling and myalgias.   Skin:  Negative for rash.   Neurological:  Negative for dizziness and headaches.   Psychiatric/Behavioral:  Negative for confusion.    All other systems reviewed and are negative.    Past Medical History:   Diagnosis Date    Anemia     Anesthesia complication     tachycardia post-op unsure from anesthesia or surgery    Anorexia     Anxiety and depression     Cholelithiasis     IBS (irritable bowel syndrome)     Insomnia     OCD (obsessive compulsive disorder)        Allergies   Allergen Reactions    Penicillins Swelling     Swelling of airway and ithing       Past Surgical History:   Procedure Laterality Date    CHOLECYSTECTOMY WITH INTRAOPERATIVE CHOLANGIOGRAM N/A 1/25/2023    Procedure: CHOLECYSTECTOMY LAPAROSCOPIC INTRAOPERATIVE CHOLANGIOGRAM;  Surgeon: Ezequiel Flores MD;  Location: [facility] MAIN OR;  Service: General;  Laterality: N/A;    COLONOSCOPY      CYST REMOVAL Right     on fallopian tube    TUMOR EXCISION Left     benign       Family History   Problem Relation Age of Onset     Cancer Mother     Brain cancer Mother     Kidney disease Father     Colon cancer Neg Hx     Colon polyps Neg Hx     Irritable bowel syndrome Neg Hx     Ulcerative colitis Neg Hx     Crohn's disease Neg Hx        Social History     Socioeconomic History    Marital status:    Tobacco Use    Smoking status: Never     Passive exposure: Never    Smokeless tobacco: Never   Vaping Use    Vaping Use: Never used   Substance and Sexual Activity    Alcohol use: Yes     Comment: socially    Drug use: Never    Sexual activity: Defer           Objective   Physical Exam  Vitals reviewed.   Constitutional:       General: She is not in acute distress.     Appearance: She is well-developed and normal weight. She is not ill-appearing, toxic-appearing or diaphoretic.   HENT:      Head: Normocephalic and atraumatic.   Eyes:      Conjunctiva/sclera: Conjunctivae normal.      Pupils: Pupils are equal, round, and reactive to light.   Cardiovascular:      Rate and Rhythm: Normal rate and regular rhythm.      Pulses:           Carotid pulses are 2+ on the right side and 2+ on the left side.       Radial pulses are 2+ on the right side and 2+ on the left side.        Dorsalis pedis pulses are 2+ on the right side and 2+ on the left side.        Posterior tibial pulses are 2+ on the right side and 2+ on the left side.      Heart sounds: Normal heart sounds.   Pulmonary:      Effort: Pulmonary effort is normal. No respiratory distress.      Breath sounds: Normal breath sounds. No decreased breath sounds, wheezing or rhonchi.   Abdominal:      General: Bowel sounds are normal. There is no distension.      Palpations: Abdomen is soft. There is no mass.      Tenderness: There is no abdominal tenderness. There is no guarding or rebound.   Musculoskeletal:         General: No deformity. Normal range of motion.      Cervical back: Normal range of motion and neck supple.      Right lower leg: No tenderness. No edema.      Left lower leg: No  "tenderness. No edema.   Skin:     General: Skin is warm and dry.      Capillary Refill: Capillary refill takes less than 2 seconds.   Neurological:      General: No focal deficit present.      Mental Status: She is alert and oriented to person, place, and time.      GCS: GCS eye subscore is 4. GCS verbal subscore is 5. GCS motor subscore is 6.      Cranial Nerves: No cranial nerve deficit.      Sensory: No sensory deficit.      Deep Tendon Reflexes: Reflexes normal.   Psychiatric:         Mood and Affect: Mood normal.         Behavior: Behavior normal.       Procedures         EKG was interpreted by myself as well as Dr. Carter as sinus rhythm with a rate of 84 no ectopy no ST elevation it was compared to the 1 yesterday dated 8/18/2023 which showed sinus rhythm with rate of 87 no changes noted    ED Course      /73   Pulse 74   Temp 97.8 øF (36.6 øC) (Oral)   Resp 15   Ht 160 cm (63\")   Wt 55.7 kg (122 lb 12.7 oz)   LMP 08/01/2023   SpO2 97%   BMI 21.75 kg/mý   Labs Reviewed   COMPREHENSIVE METABOLIC PANEL - Abnormal; Notable for the following components:       Result Value    Glucose 113 (*)     All other components within normal limits    Narrative:     GFR Normal >60  Chronic Kidney Disease <60  Kidney Failure <15     CBC WITH AUTO DIFFERENTIAL - Abnormal; Notable for the following components:    Hemoglobin 11.7 (*)     All other components within normal limits   BNP (IN-HOUSE) - Normal    Narrative:     Among patients with dyspnea, NT-proBNP is highly sensitive for the detection of acute congestive heart failure. In addition NT-proBNP of <300 pg/ml effectively rules out acute congestive heart failure with 99% negative predictive value.     SINGLE HSTROPONIN T - Normal    Narrative:     High Sensitive Troponin T Reference Range:  <10.0 ng/L- Negative Female for AMI  <15.0 ng/L- Negative Male for AMI  >=10 - Abnormal Female indicating possible myocardial injury.  >=15 - Abnormal Male indicating " possible myocardial injury.   Clinicians would have to utilize clinical acumen, EKG, Troponin, and serial changes to determine if it is an Acute Myocardial Infarction or myocardial injury due to an underlying chronic condition.        CBC AND DIFFERENTIAL    Narrative:     The following orders were created for panel order CBC & Differential.  Procedure                               Abnormality         Status                     ---------                               -----------         ------                     CBC Auto Differential[[id number]]        Abnormal            Final result                 Please view results for these tests on the individual orders.                                          Medical Decision Making  Patient is a 33-year-old female who presents with chest pain same complaints that she was seen here last night and had negative work-up she is wearing the telemetry unit that was placed last night.  Labs were obtained and interpreted by myself as within normal limits-the patient was offered admission for stress test and echocardiogram as her work-up again tonight is normal-she was advised of the risks of leaving AGAINST MEDICAL ADVICE which included death and she and her  at bedside verbalized and understood those risks she left AGAINST MEDICAL ADVICE    Problems Addressed:  Chest pain, unspecified type: complicated acute illness or injury    Amount and/or Complexity of Data Reviewed  Labs: ordered. Decision-making details documented in ED Course.  Radiology: ordered and independent interpretation performed. Decision-making details documented in ED Course.  ECG/medicine tests: ordered and independent interpretation performed. Decision-making details documented in ED Course.    Risk  OTC drugs.  Prescription drug management.  Decision regarding hospitalization.        Final diagnoses:   Chest pain, unspecified type       ED Disposition  ED Disposition       ED Disposition   AM[facility]ANKY     Condition   --    Comment   --               No follow-up provider specified.       Medication List      No changes were made to your prescriptions during this visit.            Nuris Saucedo, APRN  08/19/23 1075

## 2023-09-13 ENCOUNTER — OFFICE VISIT (OUTPATIENT)
Dept: CARDIOLOGY | Facility: CLINIC | Age: 33
End: 2023-09-13
Payer: COMMERCIAL

## 2023-09-13 VITALS
BODY MASS INDEX: 21.44 KG/M2 | SYSTOLIC BLOOD PRESSURE: 106 MMHG | HEIGHT: 63 IN | DIASTOLIC BLOOD PRESSURE: 68 MMHG | WEIGHT: 121 LBS | HEART RATE: 81 BPM | RESPIRATION RATE: 18 BRPM

## 2023-09-13 DIAGNOSIS — R07.89 OTHER CHEST PAIN: Primary | ICD-10-CM

## 2023-09-13 PROCEDURE — 99204 OFFICE O/P NEW MOD 45 MIN: CPT | Performed by: INTERNAL MEDICINE

## 2023-09-14 PROBLEM — O35.DXX0 ECHOGENIC FOCUS OF BOWEL OF FETUS AFFECTING ANTEPARTUM CARE OF MOTHER: Status: ACTIVE | Noted: 2018-02-05

## 2023-09-14 PROBLEM — R10.2 PELVIC PAIN: Status: ACTIVE | Noted: 2022-04-15

## 2023-09-14 RX ORDER — TRAZODONE HYDROCHLORIDE 50 MG/1
TABLET ORAL EVERY 12 HOURS SCHEDULED
COMMUNITY
End: 2023-10-17

## 2023-09-25 LAB
Lab: 6
TOAL ENROLLMENT DAYS: 30

## 2023-09-28 NOTE — PROGRESS NOTES
Cardiology Clinic Note  Jose Antonio Patricia MD, PhD    Subjective:     Encounter Date:09/13/2023      Patient ID: Rachell Walker is a 33 y.o. female.    Chief Complaint:  Chief Complaint   Patient presents with    Chest Pain       HPI:  Pleasure to see this 33-year-old female as a new patient today who went to the ER recently with chest pain.  She has no previous cardiac history, no family history of early CAD or sudden cardiac death, she is not diabetic and she is a non-smoker.  Blood pressure and heart rate are normal, she is wearing a heart monitor today.  ER work-up was unremarkable with normal troponins with no evidence of any acute coronary syndrome.  She is at low risk for coronary artery disease, she expresses no heart failure center symptoms unexplained syncope.  EKG reviewed from ER visit revealed no ischemic findings with otherwise normal conduction.  She was discharged for outpatient follow-up and presents here today.  She is wearing the heart monitor at this time.  No stress or echo was performed, she says still has episodic discomfort more consistent with palpitations.  These are fleeting, no exertional chest pain or typical angina.  Physical exam with normal heart sounds no significant murmurs    Review of systems otherwise negative x14 point review of systems except as mentioned above    Prior EKG reveals sinus rhythm with normal conduction    Historical data copied forward from previous encounters in EMR including the history, exam, and assessment/plan has been reviewed and is unchanged unless noted otherwise.    Cardiac medicines reviewed with risk, benefits, and necessity of each discussed.    Risk and benefit of cardiac testing reviewed including death heart attack stroke pain bleeding infection need for vascular /cardiovascular surgery were discussed and the patient     Most recent labs demonstrated normal renal function electrolytes, normal troponin, BNP was less than 50, hemoglobin 11.7, no  "white count, normal platelets, no lipid studies available    Objective:         /68 (BP Location: Left arm, Patient Position: Sitting)   Pulse 81   Resp 18   Ht 160 cm (63\")   Wt 54.9 kg (121 lb)   LMP 08/01/2023   BMI 21.43 kg/m²     Physical Exam  Regular rate and rhythm with no rubs murmurs or gallops  No heave no lift  No clubbing cyanosis or edema next normal pulses normal cap refill  Thin soft nontender nondistended  Clear to auscultation  No carotid bruits or JVD  Normal habitus  Intact grossly  Assessment:       Atypical chest pain  Heart monitor pending  We will follow this up in 4 to 8 weeks  We will defer stress or echo at this point with low cardiovascular risk, no heart failure signs or symptoms, normal EKG and normal physical exam    Diet and exercise per AHA guidelines  Heart healthy lifestyle recommended  She is on no cardiovascular medicines at this point    Return to clinic in 8 weeks for follow-up, further testing will be ordered at that time, go to the ER for any concerning signs or symptoms for inpatient work-up          The pleasure to be involved in this patient's cardiovascular care.  Please call with any questions or concerns  Jose Antonio Patricia MD, PhD    Most recent EKG as reviewed and interpreted by me:  Procedures     Most recent echo as reviewed and interpreted by me:      Most recent stress test as reviewed and interpreted by me:      Most recent cardiac catheterization as reviewed interpreted by me:  No results found for this or any previous visit.    The following portions of the patient's history were reviewed and updated as appropriate: allergies, current medications, past family history, past medical history, past social history, past surgical history, and problem list.      ROS:  14 point review of systems negative except as mentioned above    Current Outpatient Medications:     linaclotide (Linzess) 72 MCG capsule capsule, Take 1 capsule by mouth Every Morning Before " Breakfast., Disp: 20 capsule, Rfl: 0    PARoxetine CR (PAXIL-CR) 25 MG 24 hr tablet, Take 1 tablet by mouth Every Night., Disp: , Rfl:     traZODone (DESYREL) 50 MG tablet, Every 12 (Twelve) Hours., Disp: , Rfl:     Problem List:  Patient Active Problem List   Diagnosis    Chest pain    Echogenic focus of bowel of fetus affecting antepartum care of mother    Pelvic pain     Past Medical History:  Past Medical History:   Diagnosis Date    Anemia     Anesthesia complication     tachycardia post-op unsure from anesthesia or surgery    Anorexia     Anxiety and depression     Cholelithiasis     IBS (irritable bowel syndrome)     Insomnia     OCD (obsessive compulsive disorder)      Past Surgical History:  Past Surgical History:   Procedure Laterality Date    CHOLECYSTECTOMY WITH INTRAOPERATIVE CHOLANGIOGRAM N/A 1/25/2023    Procedure: CHOLECYSTECTOMY LAPAROSCOPIC INTRAOPERATIVE CHOLANGIOGRAM;  Surgeon: Ezequiel Flores MD;  Location: [facility] OR;  Service: General;  Laterality: N/A;    COLONOSCOPY      CYST REMOVAL Right     on fallopian tube    TUMOR EXCISION Left     benign     Social History:  Social History     Socioeconomic History    Marital status:    Tobacco Use    Smoking status: Never     Passive exposure: Never    Smokeless tobacco: Never   Vaping Use    Vaping Use: Never used   Substance and Sexual Activity    Alcohol use: Yes     Comment: socially    Drug use: Never    Sexual activity: Defer     Allergies:  Allergies   Allergen Reactions    Penicillins Swelling     Swelling of airway and ithing     Immunizations:  Immunization History   Administered Date(s) Administered    31-influenza Vac Quardvalent Preservativ 10/20/2017    Flu Vaccine Quad PF 6-35MO 11/25/2018    Fluzone (or Fluarix & Flulaval for VFC) >6mos 11/11/2015, 10/20/2017    Fluzone Quad >6mos (Multi-dose) 10/09/2019    Tdap 12/29/2015, 01/30/2018            In-Office Procedure(s):  No orders to display        ASCVD RIsk Score::  The  ASCVD Risk score (Adenike RECIO, et al., 2019) failed to calculate for the following reasons:    The 2019 ASCVD risk score is only valid for ages 40 to 79    Imaging:    Results for orders placed during the hospital encounter of 08/19/23    XR Chest 1 View    Narrative  XR CHEST 1 VW    Date of Exam: 8/19/2023 9:55 PM EDT    Indication: CHF/COPD Protocol  CHF/COPD Protocol    Comparison: August 18, 2023    Findings:  There are no airspace consolidations. No pleural fluid. No pneumothorax. The pulmonary vasculature appears within normal limits. The cardiac and mediastinal silhouette appear unremarkable. No acute osseous abnormality identified.    Impression  Impression:  No active disease      Electronically Signed: Kennedy Argueta MD  8/19/2023 10:28 PM EDT  Workstation ID: [id number]       Results for orders placed during the hospital encounter of 12/29/22    CT Abdomen Pelvis With Contrast    Narrative  CT ABDOMEN PELVIS W CONTRAST-    Date of Exam: 12/29/2022 3:25 PM    Indication: Abdominal pain and back pain.    Comparison: 4/9/2022    Technique: Contiguous axial CT images were obtained from the lung bases  to the pubic symphysis following uneventful administration of 80 mL  Isovue-370 intravenous. Sagittal and coronal reconstructions were  performed.  Automated exposure control and iterative reconstruction  methods were used.      FINDINGS:    Lower Chest: Limited imaging of the lung bases is grossly clear. Minimal  dependent atelectasis is noted    There is no free air noted below the diaphragm.    Organs: Mild prominence of intrahepatic biliary ducts noted. No  significant dilation of the common duct appreciated. No definite stone.  The gallbladder demonstrates cholelithiasis. Portal venous system  opacifies unremarkably    The spleen, pancreas, left kidney and adrenal glands are unremarkable.    Small low-attenuation lesions within the right kidney are too small to  characterize but likely represent  cysts.    GI/Bowel: There is grossly unremarkable appearance of the stomach. Fluid  is noted within the small bowel without evidence of structure and.    There is liquid stool diffusely within the colon with a few air fluid  levels. The appendix is visualized and appears within normal limits.    The mesentery demonstrates no suspicious adenopathy or fluid  collections.    Pelvis: Tampon is noted within the vaginal vault. The uterus and ovaries  appear unremarkable. The urinary bladder is unremarkable.    There is a cystic structure within the left tibia measuring  approximately 4 cm. Findings may represent more than the cyst. This may  have been present on the previous study.    Previously noted large cystic structure noted on the prior study is not  seen on current exam. No significant free fluid noted within the pelvis.  Prominence of the pulmonary vascularity noted which can be seen with  pelvic congestion syndrome    Peritoneum/Retroperitoneum: The aorta is normal in caliber. There is no  suspicious retroperitoneal adenopathy    Bones/Soft Tissues: No destructive bone lesion noted. Left-sided L5 pars  defect noted.    Impression  1. Liquid stool is noted diffusely within the colon with a few scattered  air-fluid levels. Findings suggest a nonspecific diarrheal illness. No  significant inflammatory change or wall thickening noted of the GI  tract. The appendix is visualized and appears normal.  2. Mild intrahepatic biliary ductal dilation is questioned. No  significant dilation of the common duct is noted. Please correlate with  liver function test and bilirubin levels. There is cholelithiasis but no  evidence of acute cholecystitis appreciated. Ultrasound can always be  considered to further evaluate if clinically indicated        Electronically Signed By-Catracho Bruno On:12/29/2022 4:11 PM  This report was finalized on [id number] by  Catracho Bruno, .      Results for orders placed during the hospital  encounter of 12/29/22    CT Abdomen Pelvis With Contrast    Narrative  CT ABDOMEN PELVIS W CONTRAST-    Date of Exam: 12/29/2022 3:25 PM    Indication: Abdominal pain and back pain.    Comparison: 4/9/2022    Technique: Contiguous axial CT images were obtained from the lung bases  to the pubic symphysis following uneventful administration of 80 mL  Isovue-370 intravenous. Sagittal and coronal reconstructions were  performed.  Automated exposure control and iterative reconstruction  methods were used.      FINDINGS:    Lower Chest: Limited imaging of the lung bases is grossly clear. Minimal  dependent atelectasis is noted    There is no free air noted below the diaphragm.    Organs: Mild prominence of intrahepatic biliary ducts noted. No  significant dilation of the common duct appreciated. No definite stone.  The gallbladder demonstrates cholelithiasis. Portal venous system  opacifies unremarkably    The spleen, pancreas, left kidney and adrenal glands are unremarkable.    Small low-attenuation lesions within the right kidney are too small to  characterize but likely represent cysts.    GI/Bowel: There is grossly unremarkable appearance of the stomach. Fluid  is noted within the small bowel without evidence of structure and.    There is liquid stool diffusely within the colon with a few air fluid  levels. The appendix is visualized and appears within normal limits.    The mesentery demonstrates no suspicious adenopathy or fluid  collections.    Pelvis: Tampon is noted within the vaginal vault. The uterus and ovaries  appear unremarkable. The urinary bladder is unremarkable.    There is a cystic structure within the left tibia measuring  approximately 4 cm. Findings may represent more than the cyst. This may  have been present on the previous study.    Previously noted large cystic structure noted on the prior study is not  seen on current exam. No significant free fluid noted within the pelvis.  Prominence of the  pulmonary vascularity noted which can be seen with  pelvic congestion syndrome    Peritoneum/Retroperitoneum: The aorta is normal in caliber. There is no  suspicious retroperitoneal adenopathy    Bones/Soft Tissues: No destructive bone lesion noted. Left-sided L5 pars  defect noted.    Impression  1. Liquid stool is noted diffusely within the colon with a few scattered  air-fluid levels. Findings suggest a nonspecific diarrheal illness. No  significant inflammatory change or wall thickening noted of the GI  tract. The appendix is visualized and appears normal.  2. Mild intrahepatic biliary ductal dilation is questioned. No  significant dilation of the common duct is noted. Please correlate with  liver function test and bilirubin levels. There is cholelithiasis but no  evidence of acute cholecystitis appreciated. Ultrasound can always be  considered to further evaluate if clinically indicated        Electronically Signed By-Catracho Bruno On:12/29/2022 4:11 PM  This report was finalized on [id number] by  Catracho Bruno, .      Lab Review:   Admission on 08/19/2023, Discharged on 08/19/2023   Component Date Value    QT Interval 08/19/2023 338     Glucose 08/19/2023 113 (H)     BUN 08/19/2023 11     Creatinine 08/19/2023 0.66     Sodium 08/19/2023 141     Potassium 08/19/2023 3.8     Chloride 08/19/2023 105     CO2 08/19/2023 25.0     Calcium 08/19/2023 9.2     Total Protein 08/19/2023 6.9     Albumin 08/19/2023 4.1     ALT (SGPT) 08/19/2023 7     AST (SGOT) 08/19/2023 12     Alkaline Phosphatase 08/19/2023 44     Total Bilirubin 08/19/2023 0.2     Globulin 08/19/2023 2.8     A/G Ratio 08/19/2023 1.5     BUN/Creatinine Ratio 08/19/2023 16.7     Anion Gap 08/19/2023 11.0     eGFR 08/19/2023 119.0     proBNP 08/19/2023 37.6     HS Troponin T 08/19/2023 <6     WBC 08/19/2023 7.50     RBC 08/19/2023 3.91     Hemoglobin 08/19/2023 11.7 (L)     Hematocrit 08/19/2023 34.4     MCV 08/19/2023 87.8     MCH 08/19/2023  29.9     MCHC 08/19/2023 34.0     RDW 08/19/2023 13.1     RDW-SD 08/19/2023 42.4     MPV 08/19/2023 7.6     Platelets 08/19/2023 260     Neutrophil % 08/19/2023 61.5     Lymphocyte % 08/19/2023 27.5     Monocyte % 08/19/2023 7.2     Eosinophil % 08/19/2023 3.0     Basophil % 08/19/2023 0.8     Neutrophils, Absolute 08/19/2023 4.60     Lymphocytes, Absolute 08/19/2023 2.10     Monocytes, Absolute 08/19/2023 0.50     Eosinophils, Absolute 08/19/2023 0.20     Basophils, Absolute 08/19/2023 0.10     nRBC 08/19/2023 0.0    Admission on 08/18/2023, Discharged on 08/18/2023   Component Date Value    QT Interval 08/18/2023 343     Glucose 08/18/2023 106 (H)     BUN 08/18/2023 8     Creatinine 08/18/2023 0.53 (L)     Sodium 08/18/2023 139     Potassium 08/18/2023 3.7     Chloride 08/18/2023 107     CO2 08/18/2023 21.0 (L)     Calcium 08/18/2023 8.5 (L)     Total Protein 08/18/2023 6.7     Albumin 08/18/2023 4.1     ALT (SGPT) 08/18/2023 7     AST (SGOT) 08/18/2023 13     Alkaline Phosphatase 08/18/2023 42     Total Bilirubin 08/18/2023 0.2     Globulin 08/18/2023 2.6     A/G Ratio 08/18/2023 1.6     BUN/Creatinine Ratio 08/18/2023 15.1     Anion Gap 08/18/2023 11.0     eGFR 08/18/2023 125.4     proBNP 08/18/2023 38.1     HS Troponin T 08/18/2023 <6     WBC 08/18/2023 6.30     RBC 08/18/2023 3.92     Hemoglobin 08/18/2023 11.8 (L)     Hematocrit 08/18/2023 35.5     MCV 08/18/2023 90.8     MCH 08/18/2023 30.1     MCHC 08/18/2023 33.2     RDW 08/18/2023 12.9     RDW-SD 08/18/2023 40.7     MPV 08/18/2023 7.5     Platelets 08/18/2023 270     Neutrophil % 08/18/2023 64.0     Lymphocyte % 08/18/2023 24.0     Monocyte % 08/18/2023 8.8     Eosinophil % 08/18/2023 2.4     Basophil % 08/18/2023 0.8     Neutrophils, Absolute 08/18/2023 4.00     Lymphocytes, Absolute 08/18/2023 1.50     Monocytes, Absolute 08/18/2023 0.60     Eosinophils, Absolute 08/18/2023 0.20     Basophils, Absolute 08/18/2023 0.10     nRBC 08/18/2023 0.1     TSH  08/18/2023 0.906     Magnesium 08/18/2023 1.9     Total Enrollment Days 08/18/2023 30     Number of Transmissions 08/18/2023 6      Recent labs reviewed and interpreted for clinical significance and application            Level of Care:           Jose Antonio Patricia MD  09/28/23  .

## 2023-10-17 ENCOUNTER — OFFICE VISIT (OUTPATIENT)
Dept: FAMILY MEDICINE CLINIC | Facility: CLINIC | Age: 33
End: 2023-10-17
Payer: COMMERCIAL

## 2023-10-17 VITALS
TEMPERATURE: 98 F | DIASTOLIC BLOOD PRESSURE: 71 MMHG | WEIGHT: 124 LBS | BODY MASS INDEX: 21.97 KG/M2 | OXYGEN SATURATION: 98 % | HEART RATE: 84 BPM | HEIGHT: 63 IN | SYSTOLIC BLOOD PRESSURE: 106 MMHG

## 2023-10-17 DIAGNOSIS — Z13.1 SCREENING FOR DIABETES MELLITUS: ICD-10-CM

## 2023-10-17 DIAGNOSIS — Z13.21 ENCOUNTER FOR VITAMIN DEFICIENCY SCREENING: ICD-10-CM

## 2023-10-17 DIAGNOSIS — Z11.59 NEED FOR HEPATITIS C SCREENING TEST: ICD-10-CM

## 2023-10-17 DIAGNOSIS — Z00.00 PREVENTATIVE HEALTH CARE: Primary | ICD-10-CM

## 2023-10-17 DIAGNOSIS — Z13.220 SCREENING FOR LIPID DISORDERS: ICD-10-CM

## 2023-10-17 DIAGNOSIS — Z23 FLU VACCINE NEED: ICD-10-CM

## 2023-10-17 DIAGNOSIS — Z13.29 SCREENING FOR THYROID DISORDER: ICD-10-CM

## 2023-10-17 RX ORDER — TRAZODONE HYDROCHLORIDE 100 MG/1
50 TABLET ORAL NIGHTLY
COMMUNITY
Start: 2023-10-04

## 2023-10-17 NOTE — PROGRESS NOTES
Chief Complaint  Establish care    Subjective        Rachell Walker is here to establish care  History of Present Illness  Rachell is a 33 year old female presenting today to establish care.    Heart Palpitations:  Heart palpitations and HR would shoot up 120-140 with just walking  Went to ER in Aug 2023 and referred to cardiologist  All tests were negative.  She was told that when her blood pressure runs low her heart rate can over com  Chest tightness  Said blood pressure runs low so her HR is overcompensating at times.  Has been pushing fluids and drinking electrolyte replacement    IBS-C:  Was previously taking Trulance.    She is now taking Linzess.  She says both of them caused severe diarrhea.  She says she only takes Linzess if she has been constipated for couple days.    She is following up with GI in the next few weeks to try different medications.    Recurrent strep:  She has had strep throat 7 times since December.  She never had strep previously.  She saw ENT recently and they did not want to do a tonsillectomy unless she had it a few more times.          Previous PCP: MARCIAL Andrade  Specialist: psychologist: Tanesha Fontenot; OBGYN: Dr. [facility]; Cardiologist: Dr. Patricia; GI: MARCIAL Roldan  Marital Status:   Children: 2  Occupation: stay at home mom  Exercise: does not exercise since heart issue. Hx anorexia. Exercise is a trigger  Diet: well balanced  Tobacco use: denies  Alcohol use: denies  Recreational drug use: denies    Routine Health Maintenance:  Dental: 11/15/23  Vision: denies  Pap Smear: UTD    Vaccines:  Influenza: will give today  COVID: UTD  Tdap: UTD      The following portions of the patient's history were reviewed and updated as appropriate: allergies, current medications, past family history, past medical history, past social history, past surgical history and problem list.    Allergies   Allergen Reactions    Penicillins Swelling      Swelling of airway and ithing         Current Outpatient Medications:     linaclotide (Linzess) 72 MCG capsule capsule, Take 1 capsule by mouth Every Morning Before Breakfast., Disp: 20 capsule, Rfl: 0    PARoxetine CR (PAXIL-CR) 25 MG 24 hr tablet, Take 1 tablet by mouth Every Night., Disp: , Rfl:     traZODone (DESYREL) 100 MG tablet, Take 0.5 tablets by mouth Every Night., Disp: , Rfl:     Family History   Problem Relation Age of Onset    Cancer Mother     Brain cancer Mother     Kidney disease Father     Colon cancer Neg Hx     Colon polyps Neg Hx     Irritable bowel syndrome Neg Hx     Ulcerative colitis Neg Hx     Crohn's disease Neg Hx         Past Medical History:   Diagnosis Date    Anemia     Anesthesia complication     tachycardia post-op unsure from anesthesia or surgery    Anorexia     Anxiety and depression     Cholelithiasis     IBS (irritable bowel syndrome)     Insomnia     OCD (obsessive compulsive disorder)        Social History     Socioeconomic History    Marital status:    Tobacco Use    Smoking status: Never     Passive exposure: Never    Smokeless tobacco: Never   Vaping Use    Vaping Use: Never used   Substance and Sexual Activity    Alcohol use: Yes     Comment: socially    Drug use: Never    Sexual activity: Defer        Past Surgical History:   Procedure Laterality Date    CHOLECYSTECTOMY WITH INTRAOPERATIVE CHOLANGIOGRAM N/A 1/25/2023    Procedure: CHOLECYSTECTOMY LAPAROSCOPIC INTRAOPERATIVE CHOLANGIOGRAM;  Surgeon: Ezequiel Flores MD;  Location: [facility] OR;  Service: General;  Laterality: N/A;    COLONOSCOPY      CYST REMOVAL Right     on fallopian tube    TUMOR EXCISION Left     benign        Patient Active Problem List   Diagnosis    Chest pain    Echogenic focus of bowel of fetus affecting antepartum care of mother    Pelvic pain       Immunization History   Administered Date(s) Administered    31-influenza Vac Quardvalent Preservativ 10/20/2017    Flu Vaccine Quad PF  "6-35MO 11/25/2018    Fluzone (or Fluarix & Flulaval for VFC) >6mos 11/11/2015, 10/20/2017, 10/17/2023    Fluzone Quad >6mos (Multi-dose) 10/09/2019    Tdap 12/29/2015, 01/30/2018         Objective   Vital Signs:  /71 (BP Location: Left arm, Patient Position: Sitting, Cuff Size: Adult)   Pulse 84   Temp 98 °F (36.7 °C) (Temporal)   Ht 160 cm (63\")   Wt 56.2 kg (124 lb)   SpO2 98%   BMI 21.97 kg/m²   Estimated body mass index is 21.97 kg/m² as calculated from the following:    Height as of this encounter: 160 cm (63\").    Weight as of this encounter: 56.2 kg (124 lb).       BMI is within normal parameters. No other follow-up for BMI required.      Review of Systems   Constitutional:  Negative for activity change, appetite change, chills, fatigue, fever and unexpected weight change.   HENT:  Negative for congestion, rhinorrhea, sneezing and sore throat.    Eyes:  Negative for visual disturbance.   Respiratory:  Negative for cough, shortness of breath and wheezing.    Cardiovascular:  Negative for chest pain.   Gastrointestinal:  Positive for constipation and diarrhea. Negative for abdominal pain, nausea and vomiting.   Genitourinary:  Negative for difficulty urinating and dysuria.   Musculoskeletal:  Negative for arthralgias, gait problem and myalgias.   Skin:  Negative for color change, rash and wound.   Neurological:  Negative for dizziness, weakness, light-headedness, numbness and headaches.   Psychiatric/Behavioral:  Negative for self-injury, sleep disturbance and suicidal ideas. The patient is not nervous/anxious.      Physical Exam  Constitutional:       Appearance: Normal appearance.   HENT:      Head: Normocephalic and atraumatic.      Right Ear: Tympanic membrane, ear canal and external ear normal.      Left Ear: Tympanic membrane, ear canal and external ear normal.      Nose: Nose normal.      Mouth/Throat:      Mouth: Mucous membranes are moist.      Pharynx: Oropharynx is clear.   Eyes:      " Extraocular Movements: Extraocular movements intact.      Conjunctiva/sclera: Conjunctivae normal.      Pupils: Pupils are equal, round, and reactive to light.   Cardiovascular:      Rate and Rhythm: Normal rate and regular rhythm.      Pulses: Normal pulses.      Heart sounds: Normal heart sounds.   Pulmonary:      Effort: Pulmonary effort is normal.      Breath sounds: Normal breath sounds.   Abdominal:      General: Abdomen is flat. Bowel sounds are normal.      Palpations: Abdomen is soft.   Musculoskeletal:         General: Normal range of motion.      Cervical back: Normal range of motion and neck supple.   Skin:     General: Skin is warm and dry.      Capillary Refill: Capillary refill takes less than 2 seconds.   Neurological:      Mental Status: She is alert and oriented to person, place, and time.   Psychiatric:         Mood and Affect: Mood normal.         Behavior: Behavior normal.         Thought Content: Thought content normal.         Judgment: Judgment normal.        Result Review :                   Assessment and Plan   Diagnoses and all orders for this visit:    1. Preventative health care (Primary)  Comments:  Overall healthy 33-year-old female.  UTD on immunizations.  Flu vaccine given today.  Labs ordered.  Will return when fasting.  F/u 1 year.  Orders:  -     Comprehensive Metabolic Panel  -     CBC & Differential    2. Screening for lipid disorders  -     Lipid Panel    3. Screening for thyroid disorder  -     T4, Free  -     TSH    4. Screening for diabetes mellitus  -     Hemoglobin A1c    5. Encounter for vitamin deficiency screening  -     Vitamin D,25-Hydroxy    6. Need for hepatitis C screening test  -     Hepatitis C Antibody    7. Flu vaccine need  -     Fluzone (or Fluarix & Flulaval for VFC) >6mos             Follow Up   Return in about 1 year (around 10/17/2024).  Patient was given instructions and counseling regarding her condition or for health maintenance advice. Please see  specific information pulled into the AVS if appropriate.

## 2023-11-07 ENCOUNTER — OFFICE VISIT (OUTPATIENT)
Dept: GASTROENTEROLOGY | Facility: CLINIC | Age: 33
End: 2023-11-07
Payer: COMMERCIAL

## 2023-11-07 VITALS
TEMPERATURE: 98.2 F | OXYGEN SATURATION: 99 % | WEIGHT: 122.8 LBS | DIASTOLIC BLOOD PRESSURE: 62 MMHG | HEIGHT: 63 IN | SYSTOLIC BLOOD PRESSURE: 98 MMHG | HEART RATE: 73 BPM | BODY MASS INDEX: 21.76 KG/M2

## 2023-11-07 DIAGNOSIS — Z90.49 HISTORY OF CHOLECYSTECTOMY: ICD-10-CM

## 2023-11-07 DIAGNOSIS — K59.00 CONSTIPATION, UNSPECIFIED CONSTIPATION TYPE: Primary | Chronic | ICD-10-CM

## 2023-11-07 PROCEDURE — 99213 OFFICE O/P EST LOW 20 MIN: CPT | Performed by: NURSE PRACTITIONER

## 2023-11-07 RX ORDER — LACTULOSE 10 G/15ML
SOLUTION ORAL
Qty: 946 ML | Refills: 5 | Status: SHIPPED | OUTPATIENT
Start: 2023-11-07

## 2023-11-07 NOTE — PROGRESS NOTES
"Chief Complaint   Patient presents with    Follow-up     Constipation         History of Present Illness  33-year-old female presents the office today for follow-up.  She was last seen in office on 4/28/2023.  She has a history of constipation.  Previous treatments have included milk of magnesia, enemas, stool softeners, Trulance and MiraLAX.  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    Previous use of Trulance was causing liquid stools.  She is currently taking Linzess 72 mcg once daily and they worked too well causing severe diarrhea, to the point where she had to plan her day around taking her medication. She went back to Trulance and would take it after about 3 days of no BM.     She has undergone cholecystectomy this year and she has not had any issues post surgery.        Result Review :       Office Visit with Zaina Mcneil APRN (04/28/2023)   CT Abdomen Pelvis With Contrast (12/29/2022 15:46)   Comprehensive Metabolic Panel (08/19/2023 22:02)     Vital Signs:   BP 98/62   Pulse 73   Temp 98.2 °F (36.8 °C)   Ht 160 cm (63\")   Wt 55.7 kg (122 lb 12.8 oz)   SpO2 99%   BMI 21.75 kg/m²     Body mass index is 21.75 kg/m².     Physical Exam  Vitals reviewed.   Constitutional:       Appearance: Normal appearance.   Pulmonary:      Effort: Pulmonary effort is normal. No respiratory distress.   Abdominal:      General: Abdomen is flat. Bowel sounds are normal. There is no distension.      Palpations: Abdomen is soft. There is no mass.      Tenderness: There is no abdominal tenderness. There is no guarding.   Musculoskeletal:         General: Normal range of motion.   Skin:     General: Skin is warm and dry.   Neurological:      General: No focal deficit present.      Mental Status: She is alert and oriented to person, place, and time.   Psychiatric:         Mood and Affect: Mood normal.         Behavior: Behavior normal.         Thought Content: " Thought content normal.         Judgment: Judgment normal.       Assessment and Plan    Diagnoses and all orders for this visit:    1. Constipation, unspecified constipation type (Primary)    2. History of cholecystectomy    Other orders  -     lactulose (CHRONULAC) 10 GM/15ML solution; Start with 10 mL daily and you may increase by 5 mL (teaspoon) at a time until desired bowel habits achieved; max dose is 60 mL per day  Dispense: 946 mL; Refill: 5           Patient Instructions   Stop Trulance.    2.  Start Lactulose at 10 mL per day. Start first dose on a day that you know you will be home so that you are able to gauge how it will affect you. You may gradually adjust your dose 1 tsp (5 mL) at a time based upon how your bowel habits respond.     3.  Plan for next office follow up in 6 months or sooner should symptoms worsen or fail to improve.       Discussion:  Due to side effects of diarrhea with both Trulance and Linzess, we will have the patient discontinue use.  We will start the patient on lactulose at low-dose 10 mL/day, and she can titrate accordingly to help produce regular daily bowel movements.  We will plan for office follow-up in 6 months for reassessment of symptoms or sooner should her symptoms worsen or fail to improve.  Patient verbalized understanding of above plan of care and is in agreement.  All questions answered and support provided.    Feng[employer] Dragon/Transcription Disclaimer:  This document has been Dictated utilizing Dragon dictation.

## 2023-11-07 NOTE — PATIENT INSTRUCTIONS
Stop Trulance.    2.  Start Lactulose at 10 mL per day. Start first dose on a day that you know you will be home so that you are able to gauge how it will affect you. You may gradually adjust your dose 1 tsp (5 mL) at a time based upon how your bowel habits respond.     3.  Plan for next office follow up in 6 months or sooner should symptoms worsen or fail to improve.

## 2023-11-15 ENCOUNTER — OFFICE VISIT (OUTPATIENT)
Dept: CARDIOLOGY | Facility: CLINIC | Age: 33
End: 2023-11-15
Payer: COMMERCIAL

## 2023-11-15 VITALS
HEIGHT: 63 IN | WEIGHT: 122 LBS | DIASTOLIC BLOOD PRESSURE: 72 MMHG | HEART RATE: 67 BPM | SYSTOLIC BLOOD PRESSURE: 111 MMHG | RESPIRATION RATE: 18 BRPM | BODY MASS INDEX: 21.62 KG/M2

## 2023-11-15 DIAGNOSIS — R07.89 OTHER CHEST PAIN: ICD-10-CM

## 2023-11-15 DIAGNOSIS — R07.2 PRECORDIAL PAIN: Primary | ICD-10-CM

## 2023-11-15 PROCEDURE — 99214 OFFICE O/P EST MOD 30 MIN: CPT | Performed by: INTERNAL MEDICINE

## 2023-12-04 NOTE — PROGRESS NOTES
Cardiology Clinic Note  Jose Antonio Patricia MD, PhD    Subjective:     Encounter Date:11/15/2023      Patient ID: Rachell Walker is a 33 y.o. female.    Chief Complaint:  Chief Complaint   Patient presents with    Follow-up       HPI:      Pleasure to see this 33-year-old female in follow-up today who went to the ER recently with atypical chest pain and palpitations.  She has no previous cardiac history, no family history of early CAD or sudden cardiac death, she is not diabetic and she is a non-smoker.  Blood pressure and heart rate are normal previously.  Upon review, ER work-up was unremarkable with normal troponins with no evidence of any acute coronary syndrome.  She is at low risk for coronary artery disease, she expresses no heart failure center symptoms unexplained syncope.  EKG reviewed from ER visit revealed no ischemic findings with otherwise normal conduction.  She was discharged for outpatient follow-up and presents here today.  She is wearing the heart monitor at this time.  No stress or echo was performed, she says still has episodic discomfort more consistent with palpitations.  At that time physical exam with normal heart sounds no significant murmurs.    Today on repeat encounter, she is doing well, event monitor demonstrated sinus rhythm and sinus tachycardia throughout the entirety of the study with no arrhythmia there were none correlated with dizziness or lightheadedness shortness of breath or atypical chest pain.     Review of systems otherwise negative x14 point review of systems except as mentioned above     Prior EKG reveals sinus rhythm with normal conduction     Historical data copied forward from previous encounters in EMR including the history, exam, and assessment/plan has been reviewed and is unchanged unless noted otherwise.     Cardiac medicines reviewed with risk, benefits, and necessity of each discussed.     Risk and benefit of cardiac testing reviewed including death heart attack  "stroke pain bleeding infection need for vascular /cardiovascular surgery were discussed and the patient      Most recent labs demonstrated normal renal function electrolytes, normal troponin, BNP was less than 50, hemoglobin 11.7, no white count, normal platelets, no lipid studies available     Objective:      Vitals reviewed below     Physical Exam  Regular rate and rhythm with no rubs murmurs or gallops  No heave no lift  No clubbing cyanosis or edema next normal pulses normal cap refill  Thin soft nontender nondistended  Clear to auscultation  No carotid bruits or JVD  Normal habitus  Intact grossly  Unchanged from prior encounter  Assessment:         Assessment   Atypical chest pain  Event monitor demonstrated sinus rhythm throughout the entirety of the study, no correlation for arrhythmia with reported symptoms of shortness of breath and lightheadedness and chest pain    We will defer stress or echo at this point with low cardiovascular risk, no heart failure signs or symptoms, normal EKG and normal physical exam     Diet and exercise per AHA guidelines  Heart healthy lifestyle recommended  She is on no cardiovascular medicines at this point    Reassurance provided  Follow-up primary care in the interim    Follow-up in 1 year    Objective:         /72 (BP Location: Right arm, Patient Position: Sitting)   Pulse 67   Resp 18   Ht 160 cm (63\")   Wt 55.3 kg (122 lb)   LMP 10/11/2023 (Approximate)   BMI 21.61 kg/m²       The pleasure to be involved in this patient's cardiovascular care.  Please call with any questions or concerns  Jose Antonio Patricia MD, PhD    Most recent EKG as reviewed and interpreted by me:  Procedures     Most recent echo as reviewed and interpreted by me:      Most recent stress test as reviewed and interpreted by me:      Most recent cardiac catheterization as reviewed interpreted by me:  No results found for this or any previous visit.    The following portions of the patient's " history were reviewed and updated as appropriate: allergies, current medications, past family history, past medical history, past social history, past surgical history, and problem list.      ROS:  14 point review of systems negative except as mentioned above    Current Outpatient Medications:     lactulose (CHRONULAC) 10 GM/15ML solution, Start with 10 mL daily and you may increase by 5 mL (teaspoon) at a time until desired bowel habits achieved; max dose is 60 mL per day, Disp: 946 mL, Rfl: 5    PARoxetine CR (PAXIL-CR) 25 MG 24 hr tablet, Take 1 tablet by mouth Every Night., Disp: , Rfl:     traZODone (DESYREL) 100 MG tablet, Take 0.5 tablets by mouth Every Night., Disp: , Rfl:     Problem List:  Patient Active Problem List   Diagnosis    Chest pain    Echogenic focus of bowel of fetus affecting antepartum care of mother    Pelvic pain     Past Medical History:  Past Medical History:   Diagnosis Date    Anemia     Anesthesia complication     tachycardia post-op unsure from anesthesia or surgery    Anorexia     Anxiety and depression     Cholelithiasis     IBS (irritable bowel syndrome)     Insomnia     OCD (obsessive compulsive disorder)      Past Surgical History:  Past Surgical History:   Procedure Laterality Date    CHOLECYSTECTOMY WITH INTRAOPERATIVE CHOLANGIOGRAM N/A 1/25/2023    Procedure: CHOLECYSTECTOMY LAPAROSCOPIC INTRAOPERATIVE CHOLANGIOGRAM;  Surgeon: Ezequiel Flores MD;  Location: [facility] MAIN OR;  Service: General;  Laterality: N/A;    COLONOSCOPY      CYST REMOVAL Right     on fallopian tube    TUMOR EXCISION Left     benign     Social History:  Social History     Socioeconomic History    Marital status:    Tobacco Use    Smoking status: Never     Passive exposure: Never    Smokeless tobacco: Never   Vaping Use    Vaping Use: Never used   Substance and Sexual Activity    Alcohol use: Yes     Comment: socially    Drug use: Never    Sexual activity: Defer     Allergies:  Allergies   Allergen  Reactions    Penicillins Swelling     Swelling of airway and ithing     Immunizations:  Immunization History   Administered Date(s) Administered    31-influenza Vac Quardvalent Preservativ 10/20/2017    Flu Vaccine Quad PF 6-35MO 11/25/2018    Fluzone (or Fluarix & Flulaval for VFC) >6mos 11/11/2015, 10/20/2017, 10/17/2023    Fluzone Quad >6mos (Multi-dose) 10/09/2019    Tdap 12/29/2015, 01/30/2018            In-Office Procedure(s):  No orders to display        ASCVD RIsk Score::  The ASCVD Risk score (Adenike RECIO, et al., 2019) failed to calculate for the following reasons:    The 2019 ASCVD risk score is only valid for ages 40 to 79    Imaging:    Results for orders placed during the hospital encounter of 08/19/23    XR Chest 1 View    Narrative  XR CHEST 1 VW    Date of Exam: 8/19/2023 9:55 PM EDT    Indication: CHF/COPD Protocol  CHF/COPD Protocol    Comparison: August 18, 2023    Findings:  There are no airspace consolidations. No pleural fluid. No pneumothorax. The pulmonary vasculature appears within normal limits. The cardiac and mediastinal silhouette appear unremarkable. No acute osseous abnormality identified.    Impression  Impression:  No active disease      Electronically Signed: Kennedy Argueta MD  8/19/2023 10:28 PM EDT  Workstation ID: [id number]       Results for orders placed during the hospital encounter of 12/29/22    CT Abdomen Pelvis With Contrast    Narrative  CT ABDOMEN PELVIS W CONTRAST-    Date of Exam: 12/29/2022 3:25 PM    Indication: Abdominal pain and back pain.    Comparison: 4/9/2022    Technique: Contiguous axial CT images were obtained from the lung bases  to the pubic symphysis following uneventful administration of 80 mL  Isovue-370 intravenous. Sagittal and coronal reconstructions were  performed.  Automated exposure control and iterative reconstruction  methods were used.      FINDINGS:    Lower Chest: Limited imaging of the lung bases is grossly clear. Minimal  dependent atelectasis  is noted    There is no free air noted below the diaphragm.    Organs: Mild prominence of intrahepatic biliary ducts noted. No  significant dilation of the common duct appreciated. No definite stone.  The gallbladder demonstrates cholelithiasis. Portal venous system  opacifies unremarkably    The spleen, pancreas, left kidney and adrenal glands are unremarkable.    Small low-attenuation lesions within the right kidney are too small to  characterize but likely represent cysts.    GI/Bowel: There is grossly unremarkable appearance of the stomach. Fluid  is noted within the small bowel without evidence of structure and.    There is liquid stool diffusely within the colon with a few air fluid  levels. The appendix is visualized and appears within normal limits.    The mesentery demonstrates no suspicious adenopathy or fluid  collections.    Pelvis: Tampon is noted within the vaginal vault. The uterus and ovaries  appear unremarkable. The urinary bladder is unremarkable.    There is a cystic structure within the left tibia measuring  approximately 4 cm. Findings may represent more than the cyst. This may  have been present on the previous study.    Previously noted large cystic structure noted on the prior study is not  seen on current exam. No significant free fluid noted within the pelvis.  Prominence of the pulmonary vascularity noted which can be seen with  pelvic congestion syndrome    Peritoneum/Retroperitoneum: The aorta is normal in caliber. There is no  suspicious retroperitoneal adenopathy    Bones/Soft Tissues: No destructive bone lesion noted. Left-sided L5 pars  defect noted.    Impression  1. Liquid stool is noted diffusely within the colon with a few scattered  air-fluid levels. Findings suggest a nonspecific diarrheal illness. No  significant inflammatory change or wall thickening noted of the GI  tract. The appendix is visualized and appears normal.  2. Mild intrahepatic biliary ductal dilation is  questioned. No  significant dilation of the common duct is noted. Please correlate with  liver function test and bilirubin levels. There is cholelithiasis but no  evidence of acute cholecystitis appreciated. Ultrasound can always be  considered to further evaluate if clinically indicated        Electronically Signed By-Catracho Bruno On:12/29/2022 4:11 PM  This report was finalized on [id number] by  Catracho Bruno, .      Results for orders placed during the hospital encounter of 12/29/22    CT Abdomen Pelvis With Contrast    Narrative  CT ABDOMEN PELVIS W CONTRAST-    Date of Exam: 12/29/2022 3:25 PM    Indication: Abdominal pain and back pain.    Comparison: 4/9/2022    Technique: Contiguous axial CT images were obtained from the lung bases  to the pubic symphysis following uneventful administration of 80 mL  Isovue-370 intravenous. Sagittal and coronal reconstructions were  performed.  Automated exposure control and iterative reconstruction  methods were used.      FINDINGS:    Lower Chest: Limited imaging of the lung bases is grossly clear. Minimal  dependent atelectasis is noted    There is no free air noted below the diaphragm.    Organs: Mild prominence of intrahepatic biliary ducts noted. No  significant dilation of the common duct appreciated. No definite stone.  The gallbladder demonstrates cholelithiasis. Portal venous system  opacifies unremarkably    The spleen, pancreas, left kidney and adrenal glands are unremarkable.    Small low-attenuation lesions within the right kidney are too small to  characterize but likely represent cysts.    GI/Bowel: There is grossly unremarkable appearance of the stomach. Fluid  is noted within the small bowel without evidence of structure and.    There is liquid stool diffusely within the colon with a few air fluid  levels. The appendix is visualized and appears within normal limits.    The mesentery demonstrates no suspicious adenopathy or  fluid  collections.    Pelvis: Tampon is noted within the vaginal vault. The uterus and ovaries  appear unremarkable. The urinary bladder is unremarkable.    There is a cystic structure within the left tibia measuring  approximately 4 cm. Findings may represent more than the cyst. This may  have been present on the previous study.    Previously noted large cystic structure noted on the prior study is not  seen on current exam. No significant free fluid noted within the pelvis.  Prominence of the pulmonary vascularity noted which can be seen with  pelvic congestion syndrome    Peritoneum/Retroperitoneum: The aorta is normal in caliber. There is no  suspicious retroperitoneal adenopathy    Bones/Soft Tissues: No destructive bone lesion noted. Left-sided L5 pars  defect noted.    Impression  1. Liquid stool is noted diffusely within the colon with a few scattered  air-fluid levels. Findings suggest a nonspecific diarrheal illness. No  significant inflammatory change or wall thickening noted of the GI  tract. The appendix is visualized and appears normal.  2. Mild intrahepatic biliary ductal dilation is questioned. No  significant dilation of the common duct is noted. Please correlate with  liver function test and bilirubin levels. There is cholelithiasis but no  evidence of acute cholecystitis appreciated. Ultrasound can always be  considered to further evaluate if clinically indicated        Electronically Signed By-Catracho Bruno On:12/29/2022 4:11 PM  This report was finalized on [id number] by  Catracho Bruno, .      Lab Review:   Admission on 08/19/2023, Discharged on 08/19/2023   Component Date Value    QT Interval 08/19/2023 338     Glucose 08/19/2023 113 (H)     BUN 08/19/2023 11     Creatinine 08/19/2023 0.66     Sodium 08/19/2023 141     Potassium 08/19/2023 3.8     Chloride 08/19/2023 105     CO2 08/19/2023 25.0     Calcium 08/19/2023 9.2     Total Protein 08/19/2023 6.9     Albumin 08/19/2023 4.1      ALT (SGPT) 08/19/2023 7     AST (SGOT) 08/19/2023 12     Alkaline Phosphatase 08/19/2023 44     Total Bilirubin 08/19/2023 0.2     Globulin 08/19/2023 2.8     A/G Ratio 08/19/2023 1.5     BUN/Creatinine Ratio 08/19/2023 16.7     Anion Gap 08/19/2023 11.0     eGFR 08/19/2023 119.0     proBNP 08/19/2023 37.6     HS Troponin T 08/19/2023 <6     WBC 08/19/2023 7.50     RBC 08/19/2023 3.91     Hemoglobin 08/19/2023 11.7 (L)     Hematocrit 08/19/2023 34.4     MCV 08/19/2023 87.8     MCH 08/19/2023 29.9     MCHC 08/19/2023 34.0     RDW 08/19/2023 13.1     RDW-SD 08/19/2023 42.4     MPV 08/19/2023 7.6     Platelets 08/19/2023 260     Neutrophil % 08/19/2023 61.5     Lymphocyte % 08/19/2023 27.5     Monocyte % 08/19/2023 7.2     Eosinophil % 08/19/2023 3.0     Basophil % 08/19/2023 0.8     Neutrophils, Absolute 08/19/2023 4.60     Lymphocytes, Absolute 08/19/2023 2.10     Monocytes, Absolute 08/19/2023 0.50     Eosinophils, Absolute 08/19/2023 0.20     Basophils, Absolute 08/19/2023 0.10     nRBC 08/19/2023 0.0    Admission on 08/18/2023, Discharged on 08/18/2023   Component Date Value    QT Interval 08/18/2023 343     Glucose 08/18/2023 106 (H)     BUN 08/18/2023 8     Creatinine 08/18/2023 0.53 (L)     Sodium 08/18/2023 139     Potassium 08/18/2023 3.7     Chloride 08/18/2023 107     CO2 08/18/2023 21.0 (L)     Calcium 08/18/2023 8.5 (L)     Total Protein 08/18/2023 6.7     Albumin 08/18/2023 4.1     ALT (SGPT) 08/18/2023 7     AST (SGOT) 08/18/2023 13     Alkaline Phosphatase 08/18/2023 42     Total Bilirubin 08/18/2023 0.2     Globulin 08/18/2023 2.6     A/G Ratio 08/18/2023 1.6     BUN/Creatinine Ratio 08/18/2023 15.1     Anion Gap 08/18/2023 11.0     eGFR 08/18/2023 125.4     proBNP 08/18/2023 38.1     HS Troponin T 08/18/2023 <6     WBC 08/18/2023 6.30     RBC 08/18/2023 3.92     Hemoglobin 08/18/2023 11.8 (L)     Hematocrit 08/18/2023 35.5     MCV 08/18/2023 90.8     MCH 08/18/2023 30.1     MCHC 08/18/2023 33.2      RDW 08/18/2023 12.9     RDW-SD 08/18/2023 40.7     MPV 08/18/2023 7.5     Platelets 08/18/2023 270     Neutrophil % 08/18/2023 64.0     Lymphocyte % 08/18/2023 24.0     Monocyte % 08/18/2023 8.8     Eosinophil % 08/18/2023 2.4     Basophil % 08/18/2023 0.8     Neutrophils, Absolute 08/18/2023 4.00     Lymphocytes, Absolute 08/18/2023 1.50     Monocytes, Absolute 08/18/2023 0.60     Eosinophils, Absolute 08/18/2023 0.20     Basophils, Absolute 08/18/2023 0.10     nRBC 08/18/2023 0.1     TSH 08/18/2023 0.906     Magnesium 08/18/2023 1.9     Total Enrollment Days 08/18/2023 30     Number of Transmissions 08/18/2023 6      Recent labs reviewed and interpreted for clinical significance and application            Level of Care:           Jose Antonio Patricia MD  12/04/23  .

## 2024-03-08 ENCOUNTER — OFFICE VISIT (OUTPATIENT)
Dept: FAMILY MEDICINE CLINIC | Facility: CLINIC | Age: 34
End: 2024-03-08
Payer: COMMERCIAL

## 2024-03-08 VITALS
OXYGEN SATURATION: 97 % | HEIGHT: 63 IN | HEART RATE: 74 BPM | BODY MASS INDEX: 21.26 KG/M2 | DIASTOLIC BLOOD PRESSURE: 72 MMHG | WEIGHT: 120 LBS | SYSTOLIC BLOOD PRESSURE: 107 MMHG | TEMPERATURE: 97.7 F

## 2024-03-08 DIAGNOSIS — R11.0 NAUSEA: Primary | ICD-10-CM

## 2024-03-08 PROCEDURE — 99213 OFFICE O/P EST LOW 20 MIN: CPT | Performed by: NURSE PRACTITIONER

## 2024-03-08 RX ORDER — TRAZODONE HYDROCHLORIDE 50 MG/1
50 TABLET ORAL NIGHTLY
COMMUNITY
Start: 2024-03-06

## 2024-03-08 RX ORDER — PAROXETINE HYDROCHLORIDE HEMIHYDRATE 37.5 MG/1
37.5 TABLET, FILM COATED, EXTENDED RELEASE ORAL NIGHTLY
COMMUNITY
Start: 2024-01-24

## 2024-03-08 NOTE — PROGRESS NOTES
"Chief Complaint  Nausea    Subjective        Rachell Walker presents to [facility]  History of Present Illness  Rachell is a 33 year old female presenting today with nausea. Her symptoms started Saturday night. Reports constant pressure in her stomach and nausea with eating. She does have IBS-C and says her constipation has been a little worse recently. She did have an episode of diarrhea on Saturday. She denies vomiting, fever or headache. She reports indigestion. Yesterday was the first day she ate something and didn't have nausea after. She had a bagel and cream cheese this morning and denies nausea. She denies any blood in stool.         The following portions of the patient's history were reviewed and updated as appropriate: allergies, current medications, past family history, past medical history, past social history, past surgical history and problem list.    Allergies   Allergen Reactions    Penicillins Swelling     Swelling of airway and ithing       Patient Active Problem List   Diagnosis    Chest pain    Echogenic focus of bowel of fetus affecting antepartum care of mother    Pelvic pain       Current Outpatient Medications   Medication Instructions    lactulose (CHRONULAC) 10 GM/15ML solution Start with 10 mL daily and you may increase by 5 mL (teaspoon) at a time until desired bowel habits achieved; max dose is 60 mL per day    PARoxetine CR (PAXIL-CR) 37.5 mg, Oral, Nightly    traZODone (DESYREL) 50 mg, Oral, Nightly          Objective   Vital Signs:  /72 (BP Location: Left arm, Patient Position: Sitting, Cuff Size: Adult)   Pulse 74   Temp 97.7 °F (36.5 °C) (Temporal)   Ht 160 cm (63\")   Wt 54.4 kg (120 lb)   SpO2 97%   BMI 21.26 kg/m²   Estimated body mass index is 21.26 kg/m² as calculated from the following:    Height as of this encounter: 160 cm (63\").    Weight as of this encounter: 54.4 kg (120 lb).       BMI is within normal parameters. No other " follow-up for BMI required.      Review of Systems   Constitutional:  Negative for appetite change, diaphoresis, fatigue and unexpected weight change.   Eyes:  Negative for visual disturbance.   Respiratory:  Negative for cough, chest tightness, shortness of breath and wheezing.    Cardiovascular:  Negative for chest pain, palpitations and leg swelling.   Gastrointestinal:  Positive for abdominal pain and nausea. Negative for blood in stool, constipation, diarrhea and vomiting.   Musculoskeletal:  Negative for back pain and gait problem.   Neurological:  Negative for dizziness, syncope, weakness, light-headedness, numbness and headaches.   Psychiatric/Behavioral:  Negative for confusion. The patient is not nervous/anxious.         Physical Exam  Constitutional:       Appearance: Normal appearance.   Cardiovascular:      Rate and Rhythm: Normal rate and regular rhythm.      Pulses: Normal pulses.      Heart sounds: Normal heart sounds.   Pulmonary:      Effort: Pulmonary effort is normal.      Breath sounds: Normal breath sounds.   Abdominal:      General: Abdomen is flat. Bowel sounds are normal.      Palpations: Abdomen is soft.      Tenderness: There is abdominal tenderness in the epigastric area.   Skin:     General: Skin is warm and dry.      Capillary Refill: Capillary refill takes less than 2 seconds.   Neurological:      Mental Status: She is alert and oriented to person, place, and time.   Psychiatric:         Mood and Affect: Mood normal.         Behavior: Behavior normal.         Thought Content: Thought content normal.         Judgment: Judgment normal.        Result Review :                   Assessment and Plan   Diagnoses and all orders for this visit:    1. Nausea (Primary)  Comments:  improving.  may start otc pepcid 20mg for indigestion.   cont to monitor and advanced diet.  call if nausea returns             Follow Up   No follow-ups on file.  Patient was given instructions and counseling regarding  her condition or for health maintenance advice. Please see specific information pulled into the AVS if appropriate.

## 2024-05-07 ENCOUNTER — OFFICE VISIT (OUTPATIENT)
Dept: GASTROENTEROLOGY | Facility: CLINIC | Age: 34
End: 2024-05-07
Payer: COMMERCIAL

## 2024-05-07 VITALS
OXYGEN SATURATION: 99 % | WEIGHT: 119.6 LBS | HEIGHT: 63 IN | TEMPERATURE: 97.4 F | HEART RATE: 62 BPM | DIASTOLIC BLOOD PRESSURE: 62 MMHG | BODY MASS INDEX: 21.19 KG/M2 | SYSTOLIC BLOOD PRESSURE: 100 MMHG

## 2024-05-07 DIAGNOSIS — Z90.49 HISTORY OF CHOLECYSTECTOMY: ICD-10-CM

## 2024-05-07 DIAGNOSIS — K59.00 CONSTIPATION, UNSPECIFIED CONSTIPATION TYPE: Primary | Chronic | ICD-10-CM

## 2024-05-07 PROCEDURE — 99214 OFFICE O/P EST MOD 30 MIN: CPT | Performed by: NURSE PRACTITIONER

## 2024-05-07 RX ORDER — PLECANATIDE 3 MG/1
3 TABLET ORAL DAILY
Qty: 90 TABLET | Refills: 3 | Status: SHIPPED | OUTPATIENT
Start: 2024-05-07

## 2024-09-04 ENCOUNTER — LAB (OUTPATIENT)
Dept: FAMILY MEDICINE CLINIC | Facility: CLINIC | Age: 34
End: 2024-09-04
Payer: COMMERCIAL

## 2024-09-04 ENCOUNTER — OFFICE VISIT (OUTPATIENT)
Dept: FAMILY MEDICINE CLINIC | Facility: CLINIC | Age: 34
End: 2024-09-04
Payer: COMMERCIAL

## 2024-09-04 VITALS
SYSTOLIC BLOOD PRESSURE: 113 MMHG | OXYGEN SATURATION: 98 % | HEIGHT: 63 IN | DIASTOLIC BLOOD PRESSURE: 66 MMHG | BODY MASS INDEX: 21.09 KG/M2 | WEIGHT: 119 LBS | HEART RATE: 87 BPM | TEMPERATURE: 98.2 F

## 2024-09-04 DIAGNOSIS — R53.83 FATIGUE, UNSPECIFIED TYPE: ICD-10-CM

## 2024-09-04 DIAGNOSIS — R53.83 FATIGUE, UNSPECIFIED TYPE: Primary | ICD-10-CM

## 2024-09-04 DIAGNOSIS — N89.8 VAGINAL DISCHARGE: ICD-10-CM

## 2024-09-04 LAB
25(OH)D3 SERPL-MCNC: 29.9 NG/ML (ref 30–100)
ALBUMIN SERPL-MCNC: 4.5 G/DL (ref 3.5–5.2)
ALBUMIN/GLOB SERPL: 1.5 G/DL
ALP SERPL-CCNC: 41 U/L (ref 39–117)
ALT SERPL W P-5'-P-CCNC: 9 U/L (ref 1–33)
ANION GAP SERPL CALCULATED.3IONS-SCNC: 6.1 MMOL/L (ref 5–15)
AST SERPL-CCNC: 16 U/L (ref 1–32)
BASOPHILS # BLD AUTO: 0.03 10*3/MM3 (ref 0–0.2)
BASOPHILS NFR BLD AUTO: 0.5 % (ref 0–1.5)
BILIRUB SERPL-MCNC: 0.2 MG/DL (ref 0–1.2)
BUN SERPL-MCNC: 8 MG/DL (ref 6–20)
BUN/CREAT SERPL: 12.1 (ref 7–25)
CALCIUM SPEC-SCNC: 9.7 MG/DL (ref 8.6–10.5)
CHLORIDE SERPL-SCNC: 105 MMOL/L (ref 98–107)
CLUE CELLS SPEC QL WET PREP: ABNORMAL
CO2 SERPL-SCNC: 26.9 MMOL/L (ref 22–29)
CREAT SERPL-MCNC: 0.66 MG/DL (ref 0.57–1)
DEPRECATED RDW RBC AUTO: 41.8 FL (ref 37–54)
EGFRCR SERPLBLD CKD-EPI 2021: 118.2 ML/MIN/1.73
EOSINOPHIL # BLD AUTO: 0.08 10*3/MM3 (ref 0–0.4)
EOSINOPHIL NFR BLD AUTO: 1.4 % (ref 0.3–6.2)
ERYTHROCYTE [DISTWIDTH] IN BLOOD BY AUTOMATED COUNT: 12.2 % (ref 12.3–15.4)
GLOBULIN UR ELPH-MCNC: 3.1 GM/DL
GLUCOSE SERPL-MCNC: 79 MG/DL (ref 65–99)
HCT VFR BLD AUTO: 35.5 % (ref 34–46.6)
HGB BLD-MCNC: 11.5 G/DL (ref 12–15.9)
HYDATID CYST SPEC WET PREP: ABNORMAL
IMM GRANULOCYTES # BLD AUTO: 0.03 10*3/MM3 (ref 0–0.05)
IMM GRANULOCYTES NFR BLD AUTO: 0.5 % (ref 0–0.5)
IRON 24H UR-MRATE: 68 MCG/DL (ref 37–145)
LYMPHOCYTES # BLD AUTO: 1.56 10*3/MM3 (ref 0.7–3.1)
LYMPHOCYTES NFR BLD AUTO: 27 % (ref 19.6–45.3)
MCH RBC QN AUTO: 30.2 PG (ref 26.6–33)
MCHC RBC AUTO-ENTMCNC: 32.4 G/DL (ref 31.5–35.7)
MCV RBC AUTO: 93.2 FL (ref 79–97)
MONOCYTES # BLD AUTO: 0.53 10*3/MM3 (ref 0.1–0.9)
MONOCYTES NFR BLD AUTO: 9.2 % (ref 5–12)
NEUTROPHILS NFR BLD AUTO: 3.55 10*3/MM3 (ref 1.7–7)
NEUTROPHILS NFR BLD AUTO: 61.4 % (ref 42.7–76)
NRBC BLD AUTO-RTO: 0 /100 WBC (ref 0–0.2)
PLATELET # BLD AUTO: 274 10*3/MM3 (ref 140–450)
PMV BLD AUTO: 9.8 FL (ref 6–12)
POTASSIUM SERPL-SCNC: 5.1 MMOL/L (ref 3.5–5.2)
PROT SERPL-MCNC: 7.6 G/DL (ref 6–8.5)
RBC # BLD AUTO: 3.81 10*6/MM3 (ref 3.77–5.28)
SODIUM SERPL-SCNC: 138 MMOL/L (ref 136–145)
T VAGINALIS SPEC QL WET PREP: ABNORMAL
T4 FREE SERPL-MCNC: 0.99 NG/DL (ref 0.92–1.68)
TSH SERPL DL<=0.05 MIU/L-ACNC: 0.73 UIU/ML (ref 0.27–4.2)
VIT B12 BLD-MCNC: 594 PG/ML (ref 211–946)
WBC NRBC COR # BLD AUTO: 5.78 10*3/MM3 (ref 3.4–10.8)
WBC SPEC QL WET PREP: ABNORMAL
YEAST GENITAL QL WET PREP: ABNORMAL

## 2024-09-04 PROCEDURE — 36415 COLL VENOUS BLD VENIPUNCTURE: CPT | Performed by: NURSE PRACTITIONER

## 2024-09-04 PROCEDURE — 84443 ASSAY THYROID STIM HORMONE: CPT | Performed by: NURSE PRACTITIONER

## 2024-09-04 PROCEDURE — 85025 COMPLETE CBC W/AUTO DIFF WBC: CPT | Performed by: NURSE PRACTITIONER

## 2024-09-04 PROCEDURE — 83540 ASSAY OF IRON: CPT | Performed by: NURSE PRACTITIONER

## 2024-09-04 PROCEDURE — 99213 OFFICE O/P EST LOW 20 MIN: CPT | Performed by: NURSE PRACTITIONER

## 2024-09-04 PROCEDURE — 82607 VITAMIN B-12: CPT | Performed by: NURSE PRACTITIONER

## 2024-09-04 PROCEDURE — 80053 COMPREHEN METABOLIC PANEL: CPT | Performed by: NURSE PRACTITIONER

## 2024-09-04 PROCEDURE — 82306 VITAMIN D 25 HYDROXY: CPT | Performed by: NURSE PRACTITIONER

## 2024-09-04 PROCEDURE — 84439 ASSAY OF FREE THYROXINE: CPT | Performed by: NURSE PRACTITIONER

## 2024-09-04 PROCEDURE — 87210 SMEAR WET MOUNT SALINE/INK: CPT | Performed by: NURSE PRACTITIONER

## 2024-09-04 NOTE — PROGRESS NOTES
"Chief Complaint  Fatigue, Dizziness, Palpitations (Have been to cardiology , thinks she may have POTS ), and Vaginal Discharge (ITCHY/ BURNS TO PEE )    Subjective        Rachell Walker presents to [facility]  History of Present Illness  Rachell is a 34-year-old female presenting today with complaints of extreme fatigue and dizziness. Her symptoms started in 2020 after she had covid. She has seen cardiology who told her she might have POTS. She has been using Liquid IV electrolytes daily. She sleeps 8-9 hours a night. She takes a daily 3 hour nap, but still feels fatigued. If she does anything strenuous she is completely drained the next day. She reports dizziness when she stands too quickly. She also thinks she has a vaginal infections. Symptoms started this morning. She reports vaginal irritation when she urinates, itching and thick white discharge.     The following portions of the patient's history were reviewed and updated as appropriate: allergies, current medications, past family history, past medical history, past social history, past surgical history and problem list.    Allergies   Allergen Reactions    Penicillins Swelling     Swelling of airway and ithing       Patient Active Problem List   Diagnosis    Chest pain    Echogenic focus of bowel of fetus affecting antepartum care of mother    Pelvic pain       Current Outpatient Medications   Medication Instructions    PARoxetine CR (PAXIL-CR) 37.5 mg, Oral, Nightly    traZODone (DESYREL) 100 mg, Oral, Nightly    Trulance 3 mg, Oral, Daily          Objective   Vital Signs:  /66 (BP Location: Left arm, Patient Position: Sitting, Cuff Size: Adult)   Pulse 87   Temp 98.2 °F (36.8 °C) (Temporal)   Ht 160 cm (63\")   Wt 54 kg (119 lb)   SpO2 98%   BMI 21.08 kg/m²   Estimated body mass index is 21.08 kg/m² as calculated from the following:    Height as of this encounter: 160 cm (63\").    Weight as of this encounter: " 54 kg (119 lb).       BMI is within normal parameters. No other follow-up for BMI required.      Review of Systems   Constitutional:  Positive for fatigue. Negative for appetite change, diaphoresis and unexpected weight change.   Eyes:  Negative for visual disturbance.   Respiratory:  Negative for cough, chest tightness, shortness of breath and wheezing.    Cardiovascular:  Negative for chest pain, palpitations and leg swelling.   Gastrointestinal:  Negative for nausea and vomiting.   Genitourinary:  Positive for vaginal discharge.   Musculoskeletal:  Negative for back pain and gait problem.   Neurological:  Positive for dizziness and light-headedness. Negative for syncope, weakness, numbness and headaches.   Psychiatric/Behavioral:  Negative for confusion. The patient is not nervous/anxious.         Physical Exam  Exam conducted with a chaperone present.   Constitutional:       Appearance: Normal appearance.   Cardiovascular:      Rate and Rhythm: Normal rate and regular rhythm.      Pulses: Normal pulses.      Heart sounds: Normal heart sounds.   Pulmonary:      Effort: Pulmonary effort is normal.      Breath sounds: Normal breath sounds.   Genitourinary:     General: Normal vulva.      Exam position: Lithotomy position.      Labia:         Right: No rash or tenderness.         Left: No rash or tenderness.    Neurological:      Mental Status: She is alert and oriented to person, place, and time.   Psychiatric:         Mood and Affect: Mood normal.         Behavior: Behavior normal.         Thought Content: Thought content normal.         Judgment: Judgment normal.        Result Review :                   Assessment and Plan   Diagnoses and all orders for this visit:    1. Fatigue, unspecified type (Primary)  Comments:  will get labs to rule out anything other than possible POTS  cont electrolyte replacement  Orders:  -     Iron; Future  -     TSH  -     T4, Free  -     Vitamin D,25-Hydroxy  -     Vitamin B12  -      CBC & Differential  -     Comprehensive Metabolic Panel    2. Vaginal discharge  Comments:  wet prep swab performed.  Orders:  -     Wet Prep, Genital - Swab, Vagina; Future  -     Wet Prep, Genital - Swab, Vagina             Follow Up   No follow-ups on file.  Patient was given instructions and counseling regarding her condition or for health maintenance advice. Please see specific information pulled into the AVS if appropriate.

## 2024-09-06 RX ORDER — FLUCONAZOLE 150 MG/1
150 TABLET ORAL ONCE
Qty: 1 TABLET | Refills: 0 | Status: SHIPPED | OUTPATIENT
Start: 2024-09-06 | End: 2024-09-06

## 2024-10-11 ENCOUNTER — OFFICE VISIT (OUTPATIENT)
Dept: FAMILY MEDICINE CLINIC | Facility: CLINIC | Age: 34
End: 2024-10-11
Payer: COMMERCIAL

## 2024-10-11 VITALS
HEIGHT: 63 IN | SYSTOLIC BLOOD PRESSURE: 115 MMHG | OXYGEN SATURATION: 96 % | DIASTOLIC BLOOD PRESSURE: 73 MMHG | BODY MASS INDEX: 20.2 KG/M2 | TEMPERATURE: 98.6 F | HEART RATE: 105 BPM | WEIGHT: 114 LBS

## 2024-10-11 DIAGNOSIS — J06.9 UPPER RESPIRATORY TRACT INFECTION, UNSPECIFIED TYPE: Primary | ICD-10-CM

## 2024-10-11 LAB
EXPIRATION DATE: NORMAL
FLUAV AG UPPER RESP QL IA.RAPID: NOT DETECTED
FLUBV AG UPPER RESP QL IA.RAPID: NOT DETECTED
INTERNAL CONTROL: NORMAL
Lab: NORMAL
SARS-COV-2 AG UPPER RESP QL IA.RAPID: NOT DETECTED

## 2024-10-11 PROCEDURE — 99214 OFFICE O/P EST MOD 30 MIN: CPT | Performed by: NURSE PRACTITIONER

## 2024-10-11 PROCEDURE — 87428 SARSCOV & INF VIR A&B AG IA: CPT | Performed by: NURSE PRACTITIONER

## 2024-10-11 RX ORDER — AZITHROMYCIN 250 MG/1
TABLET, FILM COATED ORAL
Qty: 6 TABLET | Refills: 0 | Status: SHIPPED | OUTPATIENT
Start: 2024-10-11

## 2024-10-11 RX ORDER — BENZONATATE 200 MG/1
200 CAPSULE ORAL 3 TIMES DAILY PRN
Qty: 30 CAPSULE | Refills: 1 | Status: SHIPPED | OUTPATIENT
Start: 2024-10-11

## 2024-10-11 NOTE — PROGRESS NOTES
"Chief Complaint  Fever, Generalized Body Aches, Fatigue, and Cough (Since Monday )    Subjective        Rachell Walker presents to [facility]  History of Present Illness  Rachell is a 34-year-old female presenting today with complaints of cough, fever, and bodyaches. Her symptoms started Monday. Tmax was 100.3. Her cough is productive with green sputum. She has myalgia with her fevers. She was recently at [place]. She took a home covid test and it was negative. She has been taking Tylenol and nyquil.         The following portions of the patient's history were reviewed and updated as appropriate: allergies, current medications, past family history, past medical history, past social history, past surgical history and problem list.    Allergies   Allergen Reactions    Penicillins Swelling     Swelling of airway and ithing       Patient Active Problem List   Diagnosis    Chest pain    Echogenic focus of bowel of fetus affecting antepartum care of mother    Pelvic pain       Current Outpatient Medications   Medication Instructions    azithromycin (Zithromax Z-Marques) 250 MG tablet Take 2 tablets by mouth on day 1, then 1 tablet daily on days 2-5    benzonatate (TESSALON) 200 mg, Oral, 3 Times Daily PRN    PARoxetine CR (PAXIL-CR) 37.5 mg, Oral, Nightly    traZODone (DESYREL) 100 mg, Oral, Nightly    Trulance 3 mg, Oral, Daily          Objective   Vital Signs:  /73 (BP Location: Left arm, Patient Position: Sitting, Cuff Size: Adult)   Pulse 105   Temp 98.6 °F (37 °C) (Temporal)   Ht 160 cm (63\")   Wt 51.7 kg (114 lb)   SpO2 96%   BMI 20.19 kg/m²   Estimated body mass index is 20.19 kg/m² as calculated from the following:    Height as of this encounter: 160 cm (63\").    Weight as of this encounter: 51.7 kg (114 lb).       BMI is within normal parameters. No other follow-up for BMI required.      Review of Systems   Constitutional:  Positive for chills and fever.   HENT:  " Positive for sore throat. Negative for congestion, ear pain, postnasal drip and rhinorrhea.    Respiratory:  Positive for cough. Negative for shortness of breath and wheezing.    Cardiovascular:  Negative for chest pain.   Gastrointestinal:  Positive for diarrhea. Negative for constipation, nausea and vomiting.   Musculoskeletal:  Positive for myalgias.   Skin:  Negative for rash.   Neurological:  Positive for headaches.        Physical Exam  Constitutional:       Appearance: She is ill-appearing.   HENT:      Head: Normocephalic and atraumatic.      Right Ear: Tympanic membrane, ear canal and external ear normal.      Left Ear: Tympanic membrane, ear canal and external ear normal.      Nose: Congestion present.      Mouth/Throat:      Mouth: Mucous membranes are moist.      Pharynx: Oropharynx is clear. Posterior oropharyngeal erythema present.   Eyes:      Extraocular Movements: Extraocular movements intact.      Conjunctiva/sclera: Conjunctivae normal.      Pupils: Pupils are equal, round, and reactive to light.   Cardiovascular:      Rate and Rhythm: Normal rate and regular rhythm.   Pulmonary:      Effort: Pulmonary effort is normal.      Breath sounds: Normal breath sounds.   Musculoskeletal:      Cervical back: Normal range of motion and neck supple.   Lymphadenopathy:      Cervical: No cervical adenopathy.   Skin:     General: Skin is warm and dry.   Neurological:      Mental Status: She is alert and oriented to person, place, and time.   Psychiatric:         Mood and Affect: Mood normal.         Behavior: Behavior normal.         Thought Content: Thought content normal.         Judgment: Judgment normal.        Result Review :                   Assessment and Plan   Diagnoses and all orders for this visit:    1. Upper respiratory tract infection, unspecified type (Primary)  Comments:  Flu/Covid negative  Start Zpack  Tessalon for cough  alt ibuprofen and tylenol for pain.  RTO if symptoms  persist  Orders:  -     POCT SARS-CoV-2 Antigen CONRAD + Flu    Other orders  -     azithromycin (Zithromax Z-Marques) 250 MG tablet; Take 2 tablets by mouth on day 1, then 1 tablet daily on days 2-5  Dispense: 6 tablet; Refill: 0  -     benzonatate (TESSALON) 200 MG capsule; Take 1 capsule by mouth 3 (Three) Times a Day As Needed for Cough.  Dispense: 30 capsule; Refill: 1             Follow Up   No follow-ups on file.  Patient was given instructions and counseling regarding her condition or for health maintenance advice. Please see specific information pulled into the AVS if appropriate.

## 2024-10-15 NOTE — PROGRESS NOTES
Chief Complaint  Annual Exam (Has gyn , no pap ), Cough (Was here last week , still has the cough ), and Fatigue    Subjective        Rachell Walker presents to [facility]  History of Present Illness  Rachell is a 34-year-old female presenting today for her annual physical.  She was seen in our office recently for cough, fever, and bodyaches.  We started her on a Z-Marques.  She has finished her antibiotics.  She still has a cough, but she is now only coughing up clear mucus.  She is also concerned about her fatigue.  She can sleep a solid 8 to 9 hours at night and wake up and need a nap soon after.  She has generalized bodyaches that keeps her from wanting to get up and doing things.  This has been going on for over a year.  She also has dizziness, low blood pressure, heart palpitations and occasional tachycardia.  She saw cardiology, but was not tested for POTS with the tilt table test.    Pap Smear: 2022. Normal.   Diet: balanced  Exercise: denies  Tobacco use: denies  Alcohol use: denies  Recreational drug use: denies  Dental: Sept 2024  Vision: never    Flu Vaccine: will give today  Covid Vaccine: UTD  Tdap: UTD    Objective     The following portions of the patient's history were reviewed and updated as appropriate: allergies, current medications, past family history, past medical history, past social history, past surgical history and problem list.    Allergies   Allergen Reactions    Penicillins Swelling     Swelling of airway and ithing         Current Outpatient Medications:     PARoxetine CR (PAXIL-CR) 37.5 MG 24 hr tablet, Take 1 tablet by mouth Every Night., Disp: , Rfl:     Plecanatide (Trulance) 3 MG tablet, Take 1 tablet by mouth Daily., Disp: 90 tablet, Rfl: 3    traZODone (DESYREL) 50 MG tablet, Take 2 tablets by mouth Every Night., Disp: , Rfl:     benzonatate (TESSALON) 200 MG capsule, Take 1 capsule by mouth 3 (Three) Times a Day As Needed for Cough. (Patient  not taking: Reported on 10/18/2024), Disp: 30 capsule, Rfl: 1    Family History   Problem Relation Age of Onset    Cancer Mother     Brain cancer Mother     Kidney disease Father     Colon cancer Neg Hx     Colon polyps Neg Hx     Irritable bowel syndrome Neg Hx     Ulcerative colitis Neg Hx     Crohn's disease Neg Hx         Past Medical History:   Diagnosis Date    Anemia     Anesthesia complication     tachycardia post-op unsure from anesthesia or surgery    Anorexia     Anxiety and depression     Cholelithiasis     Eating disorder     IBS (irritable bowel syndrome)     Insomnia     OCD (obsessive compulsive disorder)         Social History     Socioeconomic History    Marital status:    Tobacco Use    Smoking status: Never     Passive exposure: Never    Smokeless tobacco: Never   Vaping Use    Vaping status: Never Used   Substance and Sexual Activity    Alcohol use: Not Currently     Comment: socially    Drug use: Never    Sexual activity: Yes     Partners: Male     Birth control/protection: Vasectomy        Past Surgical History:   Procedure Laterality Date    CHOLECYSTECTOMY      CHOLECYSTECTOMY WITH INTRAOPERATIVE CHOLANGIOGRAM N/A 01/25/2023    Procedure: CHOLECYSTECTOMY LAPAROSCOPIC INTRAOPERATIVE CHOLANGIOGRAM;  Surgeon: Ezequiel Flores MD;  Location: [facility] OR;  Service: General;  Laterality: N/A;    COLONOSCOPY      CYST REMOVAL Right     on fallopian tube    TUMOR EXCISION Left     benign        Patient Active Problem List   Diagnosis    Chest pain    Echogenic focus of bowel of fetus affecting antepartum care of mother    Pelvic pain       Immunization History   Administered Date(s) Administered    31-influenza Vac Quardvalent Preservativ 10/20/2017    COVID-19 (PFIZER) Purple Cap Monovalent 04/10/2021, 05/01/2021, 12/31/2021    Flu Vaccine Quad PF 6-35MO 11/25/2018    Fluzone  >6mos 10/18/2024    Fluzone (or Fluarix & Flulaval for VFC) >6mos 11/11/2015, 10/20/2017, 10/17/2023    Fluzone  "Quad >6mos (Multi-dose) 10/09/2019    Influenza, Unspecified 10/16/2023    Tdap 12/29/2015, 01/30/2018          Vital Signs:  BP 95/67 (BP Location: Left arm, Patient Position: Sitting, Cuff Size: Adult)   Pulse 77   Temp 97.7 °F (36.5 °C) (Temporal)   Ht 160 cm (63\")   Wt 53.3 kg (117 lb 6.4 oz)   SpO2 98%   BMI 20.80 kg/m²   Estimated body mass index is 20.8 kg/m² as calculated from the following:    Height as of this encounter: 160 cm (63\").    Weight as of this encounter: 53.3 kg (117 lb 6.4 oz).       BMI is within normal parameters. No other follow-up for BMI required.    Review of Systems   Constitutional:  Positive for fatigue. Negative for activity change, appetite change, chills, fever and unexpected weight change.   HENT:  Negative for congestion, rhinorrhea, sneezing and sore throat.    Eyes:  Negative for visual disturbance.   Respiratory:  Positive for cough. Negative for shortness of breath and wheezing.    Cardiovascular:  Positive for palpitations. Negative for chest pain.   Gastrointestinal:  Negative for abdominal pain, constipation, diarrhea, nausea and vomiting.   Genitourinary:  Negative for difficulty urinating and dysuria.   Musculoskeletal:  Negative for arthralgias, gait problem and myalgias.   Skin:  Negative for color change, rash and wound.   Neurological:  Positive for dizziness. Negative for weakness, light-headedness, numbness and headaches.   Psychiatric/Behavioral:  Negative for self-injury, sleep disturbance and suicidal ideas. The patient is not nervous/anxious.       Physical Exam  Constitutional:       Appearance: Normal appearance.   HENT:      Head: Normocephalic and atraumatic.      Right Ear: Tympanic membrane, ear canal and external ear normal.      Left Ear: Tympanic membrane, ear canal and external ear normal.      Nose: Nose normal.      Mouth/Throat:      Mouth: Mucous membranes are moist.      Pharynx: Oropharynx is clear.   Eyes:      Extraocular Movements: " Extraocular movements intact.      Conjunctiva/sclera: Conjunctivae normal.      Pupils: Pupils are equal, round, and reactive to light.   Cardiovascular:      Rate and Rhythm: Normal rate and regular rhythm.      Pulses: Normal pulses.      Heart sounds: Normal heart sounds.   Pulmonary:      Effort: Pulmonary effort is normal.      Breath sounds: Normal breath sounds.   Abdominal:      General: Abdomen is flat. Bowel sounds are normal.      Palpations: Abdomen is soft.   Musculoskeletal:         General: Normal range of motion.      Cervical back: Normal range of motion and neck supple.   Skin:     General: Skin is warm and dry.      Capillary Refill: Capillary refill takes less than 2 seconds.   Neurological:      Mental Status: She is alert and oriented to person, place, and time.   Psychiatric:         Mood and Affect: Mood normal.         Behavior: Behavior normal.         Thought Content: Thought content normal.         Judgment: Judgment normal.        Result Review :                   Assessment and Plan   Diagnoses and all orders for this visit:    1. Preventative health care (Primary)  Comments:  Overall healthy 34-year-old female.  Labs ordered today.  Follow-up in 1 year or sooner if needed.  Orders:  -     Comprehensive Metabolic Panel  -     Hemoglobin A1c  -     Lipid Panel  -     T4, Free  -     TSH  -     Vitamin D,25-Hydroxy  -     CBC & Differential  -     Fluzone >6mos    2. Chronic fatigue  Comments:  Will check IVANIA to rule out autoimmune disorders.  Referral to cardiology for second opinion on POTS.  Orders:  -     IVANIA; Future    3. Hypotension, unspecified hypotension type  Comments:  referral to cardiology  Orders:  -     Cancel: Ambulatory Referral to Cardiology  -     Ambulatory Referral to Cardiology             Follow Up   Return in about 1 year (around 10/18/2025) for Annual physical.  Patient was given instructions and counseling regarding her condition or for health maintenance  advice. Please see specific information pulled into the AVS if appropriate.

## 2024-10-18 ENCOUNTER — LAB (OUTPATIENT)
Dept: FAMILY MEDICINE CLINIC | Facility: CLINIC | Age: 34
End: 2024-10-18
Payer: COMMERCIAL

## 2024-10-18 ENCOUNTER — OFFICE VISIT (OUTPATIENT)
Dept: FAMILY MEDICINE CLINIC | Facility: CLINIC | Age: 34
End: 2024-10-18
Payer: COMMERCIAL

## 2024-10-18 VITALS
HEART RATE: 77 BPM | TEMPERATURE: 97.7 F | BODY MASS INDEX: 20.8 KG/M2 | WEIGHT: 117.4 LBS | SYSTOLIC BLOOD PRESSURE: 95 MMHG | HEIGHT: 63 IN | DIASTOLIC BLOOD PRESSURE: 67 MMHG | OXYGEN SATURATION: 98 %

## 2024-10-18 DIAGNOSIS — Z00.00 PREVENTATIVE HEALTH CARE: Primary | ICD-10-CM

## 2024-10-18 DIAGNOSIS — I95.9 HYPOTENSION, UNSPECIFIED HYPOTENSION TYPE: ICD-10-CM

## 2024-10-18 DIAGNOSIS — R53.82 CHRONIC FATIGUE: ICD-10-CM

## 2024-10-18 LAB
25(OH)D3 SERPL-MCNC: 29.4 NG/ML (ref 30–100)
ALBUMIN SERPL-MCNC: 4.3 G/DL (ref 3.5–5.2)
ALBUMIN/GLOB SERPL: 1.4 G/DL
ALP SERPL-CCNC: 52 U/L (ref 39–117)
ALT SERPL W P-5'-P-CCNC: 14 U/L (ref 1–33)
ANION GAP SERPL CALCULATED.3IONS-SCNC: 6.2 MMOL/L (ref 5–15)
AST SERPL-CCNC: 15 U/L (ref 1–32)
BASOPHILS # BLD AUTO: 0.05 10*3/MM3 (ref 0–0.2)
BASOPHILS NFR BLD AUTO: 0.6 % (ref 0–1.5)
BILIRUB SERPL-MCNC: <0.2 MG/DL (ref 0–1.2)
BUN SERPL-MCNC: 7 MG/DL (ref 6–20)
BUN/CREAT SERPL: 10.1 (ref 7–25)
CALCIUM SPEC-SCNC: 9.6 MG/DL (ref 8.6–10.5)
CHLORIDE SERPL-SCNC: 105 MMOL/L (ref 98–107)
CHOLEST SERPL-MCNC: 147 MG/DL (ref 0–200)
CO2 SERPL-SCNC: 28.8 MMOL/L (ref 22–29)
CREAT SERPL-MCNC: 0.69 MG/DL (ref 0.57–1)
DEPRECATED RDW RBC AUTO: 38.9 FL (ref 37–54)
EGFRCR SERPLBLD CKD-EPI 2021: 117 ML/MIN/1.73
EOSINOPHIL # BLD AUTO: 0.15 10*3/MM3 (ref 0–0.4)
EOSINOPHIL NFR BLD AUTO: 1.9 % (ref 0.3–6.2)
ERYTHROCYTE [DISTWIDTH] IN BLOOD BY AUTOMATED COUNT: 12.2 % (ref 12.3–15.4)
GLOBULIN UR ELPH-MCNC: 3 GM/DL
GLUCOSE SERPL-MCNC: 90 MG/DL (ref 65–99)
HBA1C MFR BLD: 4.8 % (ref 4.8–5.6)
HCT VFR BLD AUTO: 34.3 % (ref 34–46.6)
HDLC SERPL-MCNC: 38 MG/DL (ref 40–60)
HGB BLD-MCNC: 11.6 G/DL (ref 12–15.9)
IMM GRANULOCYTES # BLD AUTO: 0.05 10*3/MM3 (ref 0–0.05)
IMM GRANULOCYTES NFR BLD AUTO: 0.6 % (ref 0–0.5)
LDLC SERPL CALC-MCNC: 81 MG/DL (ref 0–100)
LDLC/HDLC SERPL: 2.01 {RATIO}
LYMPHOCYTES # BLD AUTO: 2.1 10*3/MM3 (ref 0.7–3.1)
LYMPHOCYTES NFR BLD AUTO: 26.6 % (ref 19.6–45.3)
MCH RBC QN AUTO: 30.5 PG (ref 26.6–33)
MCHC RBC AUTO-ENTMCNC: 33.8 G/DL (ref 31.5–35.7)
MCV RBC AUTO: 90.3 FL (ref 79–97)
MONOCYTES # BLD AUTO: 0.54 10*3/MM3 (ref 0.1–0.9)
MONOCYTES NFR BLD AUTO: 6.8 % (ref 5–12)
NEUTROPHILS NFR BLD AUTO: 5.01 10*3/MM3 (ref 1.7–7)
NEUTROPHILS NFR BLD AUTO: 63.5 % (ref 42.7–76)
NRBC BLD AUTO-RTO: 0 /100 WBC (ref 0–0.2)
PLATELET # BLD AUTO: 364 10*3/MM3 (ref 140–450)
PMV BLD AUTO: 9.3 FL (ref 6–12)
POTASSIUM SERPL-SCNC: 4.5 MMOL/L (ref 3.5–5.2)
PROT SERPL-MCNC: 7.3 G/DL (ref 6–8.5)
RBC # BLD AUTO: 3.8 10*6/MM3 (ref 3.77–5.28)
SODIUM SERPL-SCNC: 140 MMOL/L (ref 136–145)
T4 FREE SERPL-MCNC: 1.26 NG/DL (ref 0.92–1.68)
TRIGL SERPL-MCNC: 164 MG/DL (ref 0–150)
TSH SERPL DL<=0.05 MIU/L-ACNC: 1.05 UIU/ML (ref 0.27–4.2)
VLDLC SERPL-MCNC: 28 MG/DL (ref 5–40)
WBC NRBC COR # BLD AUTO: 7.9 10*3/MM3 (ref 3.4–10.8)

## 2024-10-18 PROCEDURE — 99213 OFFICE O/P EST LOW 20 MIN: CPT | Performed by: NURSE PRACTITIONER

## 2024-10-18 PROCEDURE — 84443 ASSAY THYROID STIM HORMONE: CPT | Performed by: NURSE PRACTITIONER

## 2024-10-18 PROCEDURE — 83036 HEMOGLOBIN GLYCOSYLATED A1C: CPT | Performed by: NURSE PRACTITIONER

## 2024-10-18 PROCEDURE — 82306 VITAMIN D 25 HYDROXY: CPT | Performed by: NURSE PRACTITIONER

## 2024-10-18 PROCEDURE — 90471 IMMUNIZATION ADMIN: CPT | Performed by: NURSE PRACTITIONER

## 2024-10-18 PROCEDURE — 80061 LIPID PANEL: CPT | Performed by: NURSE PRACTITIONER

## 2024-10-18 PROCEDURE — 84439 ASSAY OF FREE THYROXINE: CPT | Performed by: NURSE PRACTITIONER

## 2024-10-18 PROCEDURE — 85025 COMPLETE CBC W/AUTO DIFF WBC: CPT | Performed by: NURSE PRACTITIONER

## 2024-10-18 PROCEDURE — 90656 IIV3 VACC NO PRSV 0.5 ML IM: CPT | Performed by: NURSE PRACTITIONER

## 2024-10-18 PROCEDURE — 80053 COMPREHEN METABOLIC PANEL: CPT | Performed by: NURSE PRACTITIONER

## 2024-10-18 PROCEDURE — 99395 PREV VISIT EST AGE 18-39: CPT | Performed by: NURSE PRACTITIONER

## 2024-10-18 PROCEDURE — 36415 COLL VENOUS BLD VENIPUNCTURE: CPT

## 2024-10-18 PROCEDURE — 86038 ANTINUCLEAR ANTIBODIES: CPT | Performed by: NURSE PRACTITIONER

## 2024-10-21 LAB — ANA SER QL: NEGATIVE

## 2024-10-26 NOTE — PROGRESS NOTES
Encounter Date:11/12/2024        Patient ID: Rachell Walker is a 34 y.o. female.      Chief Complaint:      History of Present Illness  34 years old very pleasant woman with anxiety/depression comes to cardiology office due to complaints of fatigue and chronic hypotension.  Previously seen by cardiology due to complaints of atypical chest pain and palpitations.  MCOT was completed and did not show any arrhythmia.  ECG was essentially unremarkable  She is currently on no cardiac medications.  She has 2 children.  Her symptoms got worse after COVID in 2021.  She has noted episodes of hypotension and tachycardia.  She reports  16 ounces of coffee intake daily.    Today hide review of systems are positive for shortness of breath, palpitations, dizziness/lightheadedness.      The following portions of the patient's history were reviewed and updated as appropriate: allergies, current medications, past family history, past medical history, past social history, past surgical history, and problem list.    Review of Systems   Constitutional: Positive for malaise/fatigue.   Cardiovascular:  Positive for palpitations. Negative for chest pain, dyspnea on exertion and leg swelling.   Respiratory:  Positive for shortness of breath. Negative for cough.    Gastrointestinal:  Negative for abdominal pain, nausea and vomiting.   Neurological:  Positive for dizziness and light-headedness. Negative for focal weakness, headaches and numbness.   All other systems reviewed and are negative.        Current Outpatient Medications:     PARoxetine CR (PAXIL-CR) 37.5 MG 24 hr tablet, Take 1 tablet by mouth Every Night., Disp: , Rfl:     Plecanatide (Trulance) 3 MG tablet, Take 1 tablet by mouth Daily., Disp: 90 tablet, Rfl: 3    traZODone (DESYREL) 50 MG tablet, Take 2 tablets by mouth Every Night., Disp: , Rfl:     Current outpatient and discharge medications have been reconciled for the patient.  Reviewed by: Javier Feldman MD    "    Allergies   Allergen Reactions    Penicillins Swelling     Swelling of airway and ithing       Family History   Problem Relation Age of Onset    Cancer Mother     Brain cancer Mother     Kidney disease Father     Colon cancer Neg Hx     Colon polyps Neg Hx     Irritable bowel syndrome Neg Hx     Ulcerative colitis Neg Hx     Crohn's disease Neg Hx        Past Surgical History:   Procedure Laterality Date    CHOLECYSTECTOMY      CHOLECYSTECTOMY WITH INTRAOPERATIVE CHOLANGIOGRAM N/A 01/25/2023    Procedure: CHOLECYSTECTOMY LAPAROSCOPIC INTRAOPERATIVE CHOLANGIOGRAM;  Surgeon: Ezequiel Flores MD;  Location: [facility] MAIN OR;  Service: General;  Laterality: N/A;    COLONOSCOPY      CYST REMOVAL Right     on fallopian tube    TUMOR EXCISION Left     benign       Past Medical History:   Diagnosis Date    Anemia     Anesthesia complication     tachycardia post-op unsure from anesthesia or surgery    Anorexia     Anxiety and depression     Cholelithiasis     Eating disorder     IBS (irritable bowel syndrome)     Insomnia     OCD (obsessive compulsive disorder)        Family History   Problem Relation Age of Onset    Cancer Mother     Brain cancer Mother     Kidney disease Father     Colon cancer Neg Hx     Colon polyps Neg Hx     Irritable bowel syndrome Neg Hx     Ulcerative colitis Neg Hx     Crohn's disease Neg Hx        Social History     Socioeconomic History    Marital status:    Tobacco Use    Smoking status: Never     Passive exposure: Never    Smokeless tobacco: Never   Vaping Use    Vaping status: Never Used   Substance and Sexual Activity    Alcohol use: Not Currently     Comment: socially    Drug use: Never    Sexual activity: Yes     Partners: Male     Birth control/protection: Vasectomy               Objective:       Physical Exam    /83 (BP Location: Left arm, Patient Position: Sitting, Cuff Size: Adult)   Pulse 80   Ht 160 cm (63\")   Wt 55.3 kg (122 lb)   SpO2 98%   BMI 21.61 kg/m²   The " patient is alert, oriented and in no distress.    Vital signs as noted above.    Head and neck revealed no carotid bruits or jugular venous distension.  No thyromegaly or lymphadenopathy is present.    Lungs clear.  No wheezing.  Breath sounds are normal bilaterally.    Heart normal first and second heart sounds.  No murmur..  No pericardial rub is present.  No gallop is present.    Abdomen soft and nontender.  No organomegaly is present.    Extremities revealed good peripheral pulses without any pedal edema.    Skin warm and dry.    Musculoskeletal system is grossly normal.    CNS grossly normal.           Diagnosis Plan   1. Chronic hypotension        2. Palpitations        3. Precordial pain        4. Anxiety and depression        5. Irritable bowel syndrome, unspecified type        6. Encounter for preventive care        LAB RESULTS (LAST 7 DAYS)    CBC        BMP        CMP         BNP        TROPONIN        CoAg        Creatinine Clearance  Estimated Creatinine Clearance: 100.3 mL/min (by C-G formula based on SCr of 0.69 mg/dL).    ABG        Radiology  No radiology results for the last day    EKG    ECG 12 Lead    Date/Time: 11/12/2024 1:21 PM  Performed by: Javier Feldman MD    Authorized by: Javier Feldman MD  Comparison: compared with previous ECG   Similar to previous ECG  Comments: ECG shows sinus rhythm with heart rate 65, IL interval 127, QRS 90 and QTc 400 ms.          Stress test      Echocardiogram      Cardiac catheterization  No results found for this or any previous visit.          Assessment and Plan       Diagnoses and all orders for this visit:    1. Chronic hypotension (Primary)  Concerns for POTS  Obtain an echocardiogram to rule out structural abnormalities in the heart  Encouraged hydration and salt intake  Discussed reduction in caffeine intake  Avoid sudden postural changes  Discussed medications including midodrine or beta-blocker  Blood pressure today is 106/83.    2.  Palpitations  Reviewed previous MCOT with no evidence of arrhythmia.    3. Precordial pain  Reassurance provided to the patient.    4. Anxiety and depression  Currently on paroxetine and trazodone    5. Irritable bowel syndrome, unspecified type  Currently on Trulance    6. Encounter for preventive care   LDL 81, HDL 38, triglyceride 164 and total cholesterol 147.  A1c is 4.8.  TSH is normal CBC and CMP are unremarkable

## 2024-11-12 ENCOUNTER — OFFICE VISIT (OUTPATIENT)
Dept: CARDIOLOGY | Facility: CLINIC | Age: 34
End: 2024-11-12
Payer: COMMERCIAL

## 2024-11-12 VITALS
BODY MASS INDEX: 21.62 KG/M2 | WEIGHT: 122 LBS | SYSTOLIC BLOOD PRESSURE: 106 MMHG | HEIGHT: 63 IN | OXYGEN SATURATION: 98 % | HEART RATE: 80 BPM | DIASTOLIC BLOOD PRESSURE: 83 MMHG

## 2024-11-12 DIAGNOSIS — F32.A ANXIETY AND DEPRESSION: ICD-10-CM

## 2024-11-12 DIAGNOSIS — Z00.00 ENCOUNTER FOR PREVENTIVE CARE: ICD-10-CM

## 2024-11-12 DIAGNOSIS — K58.9 IRRITABLE BOWEL SYNDROME, UNSPECIFIED TYPE: ICD-10-CM

## 2024-11-12 DIAGNOSIS — R00.2 PALPITATIONS: ICD-10-CM

## 2024-11-12 DIAGNOSIS — R07.2 PRECORDIAL PAIN: ICD-10-CM

## 2024-11-12 DIAGNOSIS — F41.9 ANXIETY AND DEPRESSION: ICD-10-CM

## 2024-11-12 DIAGNOSIS — I95.89 CHRONIC HYPOTENSION: Primary | ICD-10-CM

## 2024-11-13 ENCOUNTER — PATIENT ROUNDING (BHMG ONLY) (OUTPATIENT)
Dept: CARDIOLOGY | Facility: CLINIC | Age: 34
End: 2024-11-13
Payer: COMMERCIAL

## 2024-11-13 NOTE — PROGRESS NOTES
A My-Chart message has been sent to the patient for PATIENT ROUNDING with [facility]

## 2024-12-03 ENCOUNTER — HOSPITAL ENCOUNTER (OUTPATIENT)
Dept: CARDIOLOGY | Facility: HOSPITAL | Age: 34
Discharge: HOME | End: 2024-12-03
Admitting: INTERNAL MEDICINE
Payer: COMMERCIAL

## 2024-12-03 VITALS
DIASTOLIC BLOOD PRESSURE: 64 MMHG | HEIGHT: 63 IN | WEIGHT: 122 LBS | SYSTOLIC BLOOD PRESSURE: 105 MMHG | BODY MASS INDEX: 21.62 KG/M2

## 2024-12-03 LAB
AORTIC DIMENSIONLESS INDEX: 0.88 (DI)
BH CV ECHO LEFT VENTRICLE GLOBAL LONGITUDINAL STRAIN: -19.1 %
BH CV ECHO MEAS - AO MAX PG: 7.2 MMHG
BH CV ECHO MEAS - AO MEAN PG: 3.7 MMHG
BH CV ECHO MEAS - AO ROOT DIAM: 2.6 CM
BH CV ECHO MEAS - AO V2 MAX: 134.4 CM/SEC
BH CV ECHO MEAS - AO V2 VTI: 29 CM
BH CV ECHO MEAS - AVA(I,D): 2.6 CM2
BH CV ECHO MEAS - EDV(CUBED): 76.1 ML
BH CV ECHO MEAS - EDV(MOD-SP2): 72.3 ML
BH CV ECHO MEAS - EDV(MOD-SP4): 69.5 ML
BH CV ECHO MEAS - EF(MOD-BP): 61 %
BH CV ECHO MEAS - EF(MOD-SP2): 62.1 %
BH CV ECHO MEAS - EF(MOD-SP4): 61.9 %
BH CV ECHO MEAS - ESV(CUBED): 28.1 ML
BH CV ECHO MEAS - ESV(MOD-SP2): 27.4 ML
BH CV ECHO MEAS - ESV(MOD-SP4): 26.5 ML
BH CV ECHO MEAS - FS: 28.2 %
BH CV ECHO MEAS - IVS/LVPW: 1.02 CM
BH CV ECHO MEAS - IVSD: 0.76 CM
BH CV ECHO MEAS - LA DIMENSION: 2.6 CM
BH CV ECHO MEAS - LV MASS(C)D: 95.9 GRAMS
BH CV ECHO MEAS - LV MAX PG: 4.8 MMHG
BH CV ECHO MEAS - LV MEAN PG: 2.43 MMHG
BH CV ECHO MEAS - LV V1 MAX: 109.4 CM/SEC
BH CV ECHO MEAS - LV V1 VTI: 25.4 CM
BH CV ECHO MEAS - LVIDD: 4.2 CM
BH CV ECHO MEAS - LVIDS: 3 CM
BH CV ECHO MEAS - LVOT AREA: 2.9 CM2
BH CV ECHO MEAS - LVOT DIAM: 1.93 CM
BH CV ECHO MEAS - LVPWD: 0.75 CM
BH CV ECHO MEAS - MR MAX PG: 39.3 MMHG
BH CV ECHO MEAS - MR MAX VEL: 313.2 CM/SEC
BH CV ECHO MEAS - MV A DUR: 0.16 SEC
BH CV ECHO MEAS - MV A MAX VEL: 57.4 CM/SEC
BH CV ECHO MEAS - MV DEC SLOPE: 318.1 CM/SEC2
BH CV ECHO MEAS - MV DEC TIME: 0.25 SEC
BH CV ECHO MEAS - MV E MAX VEL: 79.3 CM/SEC
BH CV ECHO MEAS - MV E/A: 1.38
BH CV ECHO MEAS - MV MAX PG: 4.5 MMHG
BH CV ECHO MEAS - MV MEAN PG: 1.76 MMHG
BH CV ECHO MEAS - MV V2 VTI: 27.7 CM
BH CV ECHO MEAS - MVA(VTI): 2.7 CM2
BH CV ECHO MEAS - PA ACC TIME: 0.13 SEC
BH CV ECHO MEAS - PA V2 MAX: 99.9 CM/SEC
BH CV ECHO MEAS - PULM A REVS DUR: 0.18 SEC
BH CV ECHO MEAS - PULM A REVS VEL: 21.1 CM/SEC
BH CV ECHO MEAS - PULM DIAS VEL: 75.2 CM/SEC
BH CV ECHO MEAS - PULM S/D: 0.74
BH CV ECHO MEAS - PULM SYS VEL: 55.5 CM/SEC
BH CV ECHO MEAS - RAP SYSTOLE: 3 MMHG
BH CV ECHO MEAS - RV MAX PG: 2.37 MMHG
BH CV ECHO MEAS - RV V1 MAX: 77 CM/SEC
BH CV ECHO MEAS - RV V1 VTI: 17.2 CM
BH CV ECHO MEAS - RVDD: 2.5 CM
BH CV ECHO MEAS - RVSP: 20.7 MMHG
BH CV ECHO MEAS - SV(LVOT): 74.3 ML
BH CV ECHO MEAS - SV(MOD-SP2): 44.9 ML
BH CV ECHO MEAS - SV(MOD-SP4): 43.1 ML
BH CV ECHO MEAS - TR MAX PG: 17.7 MMHG
BH CV ECHO MEAS - TR MAX VEL: 210.1 CM/SEC

## 2024-12-03 PROCEDURE — 93306 TTE W/DOPPLER COMPLETE: CPT

## 2024-12-03 PROCEDURE — 93356 MYOCRD STRAIN IMG SPCKL TRCK: CPT | Performed by: INTERNAL MEDICINE

## 2024-12-03 PROCEDURE — 93356 MYOCRD STRAIN IMG SPCKL TRCK: CPT

## 2024-12-03 PROCEDURE — 93306 TTE W/DOPPLER COMPLETE: CPT | Performed by: INTERNAL MEDICINE

## 2025-01-03 ENCOUNTER — OFFICE VISIT (OUTPATIENT)
Dept: FAMILY MEDICINE CLINIC | Facility: CLINIC | Age: 35
End: 2025-01-03
Payer: COMMERCIAL

## 2025-01-03 VITALS
SYSTOLIC BLOOD PRESSURE: 100 MMHG | TEMPERATURE: 98 F | HEART RATE: 80 BPM | HEIGHT: 63 IN | DIASTOLIC BLOOD PRESSURE: 62 MMHG | BODY MASS INDEX: 21.44 KG/M2 | WEIGHT: 121 LBS | OXYGEN SATURATION: 97 %

## 2025-01-03 DIAGNOSIS — H66.92 LEFT OTITIS MEDIA, UNSPECIFIED OTITIS MEDIA TYPE: Primary | ICD-10-CM

## 2025-01-03 PROCEDURE — 99214 OFFICE O/P EST MOD 30 MIN: CPT | Performed by: NURSE PRACTITIONER

## 2025-01-03 RX ORDER — CHLORCYCLIZINE HYDROCHLORIDE AND PSEUDOEPHEDRINE HYDROCHLORIDE 25; 60 MG/1; MG/1
1 TABLET ORAL 2 TIMES DAILY PRN
Qty: 42 TABLET | Refills: 0 | Status: SHIPPED | OUTPATIENT
Start: 2025-01-03

## 2025-01-03 RX ORDER — AZITHROMYCIN 250 MG/1
TABLET, FILM COATED ORAL
Qty: 6 TABLET | Refills: 0 | Status: SHIPPED | OUTPATIENT
Start: 2025-01-03

## 2025-01-03 NOTE — PROGRESS NOTES
"Chief Complaint  Earache (Left ear , for a week , getting worse , sharp pain )    Subjective        Rachell Walker presents to [facility]  History of Present Illness  Rachell is a 34-year-old female presenting today with complaints of left ear pain.  Her symptoms started a week ago.  She says it started as pressure and this morning she developed sharp pain in her left ear.  She is also had pressure behind her left eye.  She reports congestion.  She has been taking Sudafed.        The following portions of the patient's history were reviewed and updated as appropriate: allergies, current medications, past family history, past medical history, past social history, past surgical history and problem list.    Allergies   Allergen Reactions    Penicillins Swelling     Swelling of airway and ithing       Patient Active Problem List   Diagnosis    Chest pain    Echogenic focus of bowel of fetus affecting antepartum care of mother    Pelvic pain       Current Outpatient Medications   Medication Instructions    azithromycin (Zithromax Z-Marques) 250 MG tablet Take 2 tablets by mouth on day 1, then 1 tablet daily on days 2-5    Chlorcyclizine-Pseudoephed (Stahist AD) 25-60 MG tablet 1 tablet, Oral, 2 Times Daily PRN    PARoxetine CR (PAXIL-CR) 37.5 mg, Nightly    traZODone (DESYREL) 100 mg, Nightly    Trulance 3 mg, Oral, Daily          Objective   Vital Signs:  /62 (BP Location: Right arm, Patient Position: Sitting, Cuff Size: Adult)   Pulse 80   Temp 98 °F (36.7 °C) (Temporal)   Ht 160 cm (62.99\")   Wt 54.9 kg (121 lb)   SpO2 97%   BMI 21.44 kg/m²   Estimated body mass index is 21.44 kg/m² as calculated from the following:    Height as of this encounter: 160 cm (62.99\").    Weight as of this encounter: 54.9 kg (121 lb).       BMI is within normal parameters. No other follow-up for BMI required.      Review of Systems   Constitutional:  Negative for chills, fatigue and fever. "   HENT:  Positive for congestion and ear pain. Negative for hearing loss, rhinorrhea, sore throat and tinnitus.    Respiratory:  Negative for cough.    Musculoskeletal:  Positive for neck pain.   Skin:  Negative for rash.   Neurological:  Negative for dizziness and headaches.   Hematological:  Negative for adenopathy.        Physical Exam  Constitutional:       Appearance: Normal appearance.   HENT:      Right Ear: Tympanic membrane, ear canal and external ear normal.      Left Ear: Ear canal and external ear normal. Tympanic membrane is erythematous.      Nose: Congestion present.      Mouth/Throat:      Mouth: Mucous membranes are moist.      Pharynx: Oropharynx is clear.   Eyes:      Extraocular Movements: Extraocular movements intact.      Conjunctiva/sclera: Conjunctivae normal.      Pupils: Pupils are equal, round, and reactive to light.   Cardiovascular:      Rate and Rhythm: Normal rate and regular rhythm.   Pulmonary:      Effort: Pulmonary effort is normal.      Breath sounds: Normal breath sounds.   Skin:     General: Skin is warm and dry.   Neurological:      Mental Status: She is alert and oriented to person, place, and time.   Psychiatric:         Mood and Affect: Mood normal.         Behavior: Behavior normal.         Thought Content: Thought content normal.         Judgment: Judgment normal.        Result Review :                   Assessment and Plan   Diagnoses and all orders for this visit:    1. Left otitis media, unspecified otitis media type (Primary)  Comments:  Start azithromycin and Stahist.  May take ibuprofen or Tylenol for any pain.  RTO if symptoms persist    Other orders  -     azithromycin (Zithromax Z-Marques) 250 MG tablet; Take 2 tablets by mouth on day 1, then 1 tablet daily on days 2-5  Dispense: 6 tablet; Refill: 0  -     Chlorcyclizine-Pseudoephed (Stahist AD) 25-60 MG tablet; Take 1 tablet by mouth 2 (Two) Times a Day As Needed (sinus pressure / congestion).  Dispense: 42 tablet;  Refill: 0             Follow Up   No follow-ups on file.  Patient was given instructions and counseling regarding her condition or for health maintenance advice. Please see specific information pulled into the AVS if appropriate.

## 2025-02-08 NOTE — PROGRESS NOTES
Encounter Date:02/12/2025        Patient ID: Rachell Walker is a 34 y.o. female.      Chief Complaint:      History of Present Illness  34 years old very pleasant woman with anxiety/depression comes to cardiology office due to complaints of fatigue and chronic hypotension.  Previously seen by cardiology due to complaints of atypical chest pain and palpitations.  MCOT was completed and did not show any arrhythmia.  ECG was essentially unremarkable  She is currently on no cardiac medications.  She has 2 children.  Her symptoms got worse after COVID in 2021.  She has noted episodes of hypotension and tachycardia.  She reports  16 ounces of coffee intake daily.    Review of system positive for shortness of breath, dizziness, lightheadedness, palpitations.  She is currently on no cardiac medications.      The following portions of the patient's history were reviewed and updated as appropriate: allergies, current medications, past family history, past medical history, past social history, past surgical history, and problem list.    Review of Systems   Constitutional: Positive for malaise/fatigue.   Cardiovascular:  Positive for palpitations. Negative for chest pain, dyspnea on exertion and leg swelling.   Respiratory:  Positive for shortness of breath. Negative for cough.    Gastrointestinal:  Negative for abdominal pain, nausea and vomiting.   Neurological:  Positive for dizziness and light-headedness. Negative for focal weakness, headaches and numbness.   All other systems reviewed and are negative.        Current Outpatient Medications:     multivitamin (MULTI VITAMIN DAILY PO), Take 1 tablet by mouth Daily., Disp: , Rfl:     PARoxetine CR (PAXIL-CR) 37.5 MG 24 hr tablet, Take 1 tablet by mouth Every Night., Disp: , Rfl:     Plecanatide (Trulance) 3 MG tablet, Take 1 tablet by mouth Daily., Disp: 90 tablet, Rfl: 3    traZODone (DESYREL) 100 MG tablet, Take 1 tablet by mouth Every Night., Disp: , Rfl:     Current  outpatient and discharge medications have been reconciled for the patient.  Reviewed by: Javier Feldman MD       Allergies   Allergen Reactions    Penicillins Itching and Swelling     Swelling of airway and ithing       Family History   Problem Relation Age of Onset    Cancer Mother     Brain cancer Mother     Kidney disease Father     Heart attack Paternal Grandmother     Colon cancer Neg Hx     Colon polyps Neg Hx     Irritable bowel syndrome Neg Hx     Ulcerative colitis Neg Hx     Crohn's disease Neg Hx        Past Surgical History:   Procedure Laterality Date    CHOLECYSTECTOMY      CHOLECYSTECTOMY WITH INTRAOPERATIVE CHOLANGIOGRAM N/A 01/25/2023    Procedure: CHOLECYSTECTOMY LAPAROSCOPIC INTRAOPERATIVE CHOLANGIOGRAM;  Surgeon: Ezequiel Flores MD;  Location: [facility] MAIN OR;  Service: General;  Laterality: N/A;    COLONOSCOPY      CYST REMOVAL Right     on fallopian tube    TUMOR EXCISION Left     benign       Past Medical History:   Diagnosis Date    Anemia     Anesthesia complication     tachycardia post-op unsure from anesthesia or surgery    Anorexia     Anxiety and depression     Cholelithiasis     Eating disorder     IBS (irritable bowel syndrome)     Insomnia     OCD (obsessive compulsive disorder)        Family History   Problem Relation Age of Onset    Cancer Mother     Brain cancer Mother     Kidney disease Father     Heart attack Paternal Grandmother     Colon cancer Neg Hx     Colon polyps Neg Hx     Irritable bowel syndrome Neg Hx     Ulcerative colitis Neg Hx     Crohn's disease Neg Hx        Social History     Socioeconomic History    Marital status:    Tobacco Use    Smoking status: Never     Passive exposure: Never    Smokeless tobacco: Never   Vaping Use    Vaping status: Never Used   Substance and Sexual Activity    Alcohol use: Not Currently     Comment: socially    Drug use: Never    Sexual activity: Yes     Partners: Male     Birth control/protection: Vasectomy  "              Objective:       Physical Exam    /62 (BP Location: Left arm, Patient Position: Sitting, Cuff Size: Adult)   Pulse 80   Ht 157.5 cm (62\")   Wt 55.3 kg (122 lb)   LMP  (LMP Unknown)   SpO2 97%   BMI 22.31 kg/m²   The patient is alert, oriented and in no distress.    Vital signs as noted above.    Head and neck revealed no carotid bruits or jugular venous distension.  No thyromegaly or lymphadenopathy is present.    Lungs clear.  No wheezing.  Breath sounds are normal bilaterally.    Heart normal first and second heart sounds.  No murmur..  No pericardial rub is present.  No gallop is present.    Abdomen soft and nontender.  No organomegaly is present.    Extremities revealed good peripheral pulses without any pedal edema.    Skin warm and dry.    Musculoskeletal system is grossly normal.    CNS grossly normal.           Diagnosis Plan   1. Chronic hypotension        2. Palpitations        3. Precordial pain        4. Anxiety and depression        5. Irritable bowel syndrome, unspecified type        6. Encounter for preventive care        LAB RESULTS (LAST 7 DAYS)    CBC        BMP        CMP         BNP        TROPONIN        CoAg        Creatinine Clearance  CrCl cannot be calculated (Patient's most recent lab result is older than the maximum 30 days allowed.).    ABG        Radiology  No radiology results for the last day    EKG  Procedures    Stress test      Echocardiogram  Results for orders placed in visit on 11/12/24    Adult Transthoracic Echo Complete W/ Cont if Necessary Per Protocol    Interpretation Summary    Left ventricular systolic function is normal. Calculated left ventricular EF = 61% Left ventricular ejection fraction appears to be 61 - 65%.    Left ventricular diastolic function was normal.  Average GLS -19.1%.    Estimated right ventricular systolic pressure from tricuspid regurgitation is normal (<35 mmHg).    No significant valvular abnormalities noted.      Cardiac " catheterization  No results found for this or any previous visit.          Assessment and Plan       Diagnoses and all orders for this visit:    1. Chronic hypotension (Primary)    2. Palpitations    3. Precordial pain    4. Anxiety and depression    5. Irritable bowel syndrome, unspecified type    6. Encounter for preventive care         1. Chronic hypotension (Primary)  Concerns for POTS  Echocardiogram shows preserved LV function, no structural abnormalities, normal valve function  Encouraged hydration and salt intake  Discussed reduction in caffeine intake  Avoid sudden postural changes  Discussed medications including midodrine or beta-blocker  Blood pressure today is 103/62.  Heart rate 80  She will maintain a blood pressure and heart rate log when the symptoms occur  She wears an Apple Watch to track her heart rate.  Not starting beta-blocker due to highly infrequent episodes.     2. Palpitations  Reviewed previous MCOT with no evidence of arrhythmia.  Average heart rate in the 70s with no significant arrhythmias.     3. Precordial pain  Reassurance provided to the patient.     4. Anxiety and depression  Currently on paroxetine and trazodone     5. Irritable bowel syndrome, unspecified type  Currently on Trulance     6. Encounter for preventive care   LDL 81, HDL 38, triglyceride 164 and total cholesterol 147.  A1c is 4.8.  TSH is normal CBC and CMP are unremarkable

## 2025-02-12 ENCOUNTER — OFFICE VISIT (OUTPATIENT)
Dept: CARDIOLOGY | Facility: CLINIC | Age: 35
End: 2025-02-12
Payer: COMMERCIAL

## 2025-02-12 VITALS
BODY MASS INDEX: 22.45 KG/M2 | OXYGEN SATURATION: 97 % | HEART RATE: 80 BPM | DIASTOLIC BLOOD PRESSURE: 62 MMHG | WEIGHT: 122 LBS | SYSTOLIC BLOOD PRESSURE: 103 MMHG | HEIGHT: 62 IN

## 2025-02-12 DIAGNOSIS — F41.9 ANXIETY AND DEPRESSION: ICD-10-CM

## 2025-02-12 DIAGNOSIS — R00.2 PALPITATIONS: ICD-10-CM

## 2025-02-12 DIAGNOSIS — I95.89 CHRONIC HYPOTENSION: Primary | ICD-10-CM

## 2025-02-12 DIAGNOSIS — F32.A ANXIETY AND DEPRESSION: ICD-10-CM

## 2025-02-12 DIAGNOSIS — R07.2 PRECORDIAL PAIN: ICD-10-CM

## 2025-02-12 DIAGNOSIS — Z00.00 ENCOUNTER FOR PREVENTIVE CARE: ICD-10-CM

## 2025-02-12 DIAGNOSIS — K58.9 IRRITABLE BOWEL SYNDROME, UNSPECIFIED TYPE: ICD-10-CM

## 2025-02-12 RX ORDER — DIPHENOXYLATE HYDROCHLORIDE AND ATROPINE SULFATE 2.5; .025 MG/1; MG/1
1 TABLET ORAL DAILY
COMMUNITY

## 2025-02-12 RX ORDER — TRAZODONE HYDROCHLORIDE 100 MG/1
100 TABLET ORAL NIGHTLY
COMMUNITY
Start: 2025-02-09

## 2025-03-03 ENCOUNTER — TELEPHONE (OUTPATIENT)
Dept: FAMILY MEDICINE CLINIC | Facility: CLINIC | Age: 35
End: 2025-03-03
Payer: COMMERCIAL

## 2025-03-03 RX ORDER — OSELTAMIVIR PHOSPHATE 75 MG/1
75 CAPSULE ORAL 2 TIMES DAILY
Qty: 10 CAPSULE | Refills: 0 | Status: SHIPPED | OUTPATIENT
Start: 2025-03-03

## 2025-03-03 NOTE — TELEPHONE ENCOUNTER
Caller: Rachell Walker    Relationship: Self    Best call back number:   Telephone Information:   Mobile [phone]         What medication are you requesting: TAMIFLU    What are your current symptoms: SHE IS NOT HAVING SYMPTOMS YET BUT BOTH OF HER CHILDREN ARE POSITIVE FOR FLU A AS OF THIS WEEKEND    How long have you been experiencing symptoms:     Have you had these symptoms before:    [] Yes  [] No    Have you been treated for these symptoms before:   [] Yes  [] No    If a prescription is needed, what is your preferred pharmacy and phone number: [id number] - [place], [place] - [address] - [phone]  - [phone]      Additional notes:

## 2025-05-21 RX ORDER — PLECANATIDE 3 MG/1
3 TABLET ORAL DAILY
Qty: 90 TABLET | Refills: 3 | Status: SHIPPED | OUTPATIENT
Start: 2025-05-21

## 2025-06-18 NOTE — PROGRESS NOTES
Chief Complaint   Patient presents with    Constipation     Abdominal bloating       History of Present Illness  Patient is a 35-year-old female, new to me, but she is an established patient of our practice and was last seen by MARCIAL Dias on 5/7/2024    Progress Notes by Zaina Mcneil APRN (05/07/2024 09:15)   (Copied text in this note has been reviewed, modified, and accurate as of 6/18/25)    Chronic Constipation  - Refractory to Trulance, MiraLAX, enemas, Milk of Magnesia, and Linzess. She has been on lactulose, but does not feel it is consistent  -Previous visit with Zaina, she was restarted on Trulance 3 mg daily.  Discontinue lactulose as this was not efficacious.  Continue on MiraLAX as needed. She reports Trulance causes watery stools  - Colonoscopy in 2009 showed a small, insignificant finding  - Reports frequent bloating and discomfort, extending to shoulders, back, and stomach  - No diarrhea, but excessive gas  - No blood in stool  - Avoids fiber supplements, tea, and soda    Eating Disorder Recovery  - In recovery from an eating disorder; underwent intensive outpatient treatment in 2019    Supplemental information: Gallbladder removed about a year or two ago. Has POTS.    FAMILY HISTORY  She does not have a family history of colon cancer.    MEDICATIONS  Current: Trulance, MiraLAX  Discontinued: lactulose, Linzess       Result Review :    QuantiFERON-TB Gold Plus (03/10/2025 09:23) negative    IVANIA (10/18/2024 09:55) normal    CBC & Differential (10/18/2024 09:55) hemoglobin 11.6    Vitamin D,25-Hydroxy (10/18/2024 09:55) 29.4    TSH (10/18/2024 09:55) normal    Lipid Panel (10/18/2024 09:55) triglyceride 164, HDL 38, LDL 81    Hemoglobin A1c (10/18/2024 09:55) 4.80    Comprehensive Metabolic Panel (10/18/2024 09:55) normal LFT, ALK  ----------------------------------------------------------------------------  CT Abdomen Pelvis With Contrast (12/29/2022 15:46)   - Nonspecific diarrheal  "illness  - Mild intrahepatic biliary ductal dilatation is questioned.  Cholelithiasis with no evidence of acute cholecystitis  (Refer to report for complete details)    2009 Colonoscopy - no significant concerns. Patient verbally reported. Details are not recalled by patient.       Vital Signs:   /62   Pulse 83   Temp 97.9 °F (36.6 °C)   Ht 157.5 cm (62\")   Wt 55.2 kg (121 lb 9.6 oz)   SpO2 98%   BMI 22.24 kg/m²     Body mass index is 22.24 kg/m².     Physical Exam  Vitals reviewed.   Constitutional:       Appearance: Normal appearance.   Pulmonary:      Effort: Pulmonary effort is normal. No respiratory distress.   Abdominal:      General: Abdomen is flat. Bowel sounds are normal. There is no distension.      Palpations: Abdomen is soft. There is no mass.      Tenderness: There is no abdominal tenderness. There is no guarding.   Musculoskeletal:         General: Normal range of motion.   Skin:     General: Skin is warm and dry.   Neurological:      General: No focal deficit present.      Mental Status: She is alert and oriented to person, place, and time.   Psychiatric:         Mood and Affect: Mood normal.         Behavior: Behavior normal.         Thought Content: Thought content normal.         Judgment: Judgment normal.       Assessment and Plan    Diagnoses and all orders for this visit:    1. Constipation, unspecified constipation type (Primary)    2. Bloating symptom      Assessment & Plan  1. Constipation: Chronic.  2. Bloating  - History of constipation, refractory to multiple treatments. Currently on Trulance 3 mg daily and MiraLAX. Symptoms include bloating, gas, and discomfort. No colonoscopy since 2009.  - Order KUB x-ray to assess for stool burden.  - Order hydrogen breath test for SIBO (bloating and gaseous)  - Continue Trulance and MiraLAX.  - Increase water intake.  - Consider colonoscopy if symptoms worsen. Recommend IBgard for cramping, bloating, and gas.    Follow-up  - In 3 " weeks.    PROCEDURE  Colonoscopy in 2009 revealed a small insignificant finding. Gallbladder removal about a year or two ago.      There are no Patient Instructions on file for this visit.      EMR Dragon/Transcription Disclaimer:  This document has been Dictated utilizing Dragon dictation.

## 2025-06-19 ENCOUNTER — HOSPITAL ENCOUNTER (OUTPATIENT)
Dept: GENERAL RADIOLOGY | Facility: HOSPITAL | Age: 35
Discharge: HOME | End: 2025-06-19
Admitting: NURSE PRACTITIONER
Payer: COMMERCIAL

## 2025-06-19 ENCOUNTER — OFFICE VISIT (OUTPATIENT)
Dept: GASTROENTEROLOGY | Facility: CLINIC | Age: 35
End: 2025-06-19
Payer: COMMERCIAL

## 2025-06-19 VITALS
HEART RATE: 83 BPM | OXYGEN SATURATION: 98 % | SYSTOLIC BLOOD PRESSURE: 100 MMHG | DIASTOLIC BLOOD PRESSURE: 62 MMHG | HEIGHT: 62 IN | WEIGHT: 121.6 LBS | BODY MASS INDEX: 22.38 KG/M2 | TEMPERATURE: 97.9 F

## 2025-06-19 DIAGNOSIS — K59.00 CONSTIPATION, UNSPECIFIED CONSTIPATION TYPE: Primary | ICD-10-CM

## 2025-06-19 DIAGNOSIS — R14.0 BLOATING SYMPTOM: ICD-10-CM

## 2025-06-19 PROCEDURE — 74018 RADEX ABDOMEN 1 VIEW: CPT

## 2025-06-19 PROCEDURE — 99214 OFFICE O/P EST MOD 30 MIN: CPT | Performed by: NURSE PRACTITIONER

## 2025-06-19 RX ORDER — PLECANATIDE 3 MG/1
TABLET ORAL
COMMUNITY
End: 2025-06-19

## 2025-06-19 RX ORDER — POLYETHYLENE GLYCOL 3350 17 G/17G
POWDER, FOR SOLUTION ORAL EVERY 24 HOURS
COMMUNITY

## 2025-06-19 NOTE — PATIENT INSTRUCTIONS
- Continue with Trulance and Miralax  - Increase water intake to 64 oz a day  - Proceed with KUB Xray abdomen to assess for stool burden.  The hydrogen breath test is a non-invasive diagnostic procedure used to detect small intestinal bacterial overgrowth (SIBO). During this this, you will consume a sugar solution, typically lactulose or glucose. If SIBO is present, bacteria in the small intestine ferment the sugar, producing hydrogen (or methane) gas. This gas is absorbed into the blood stream and eventually exhaled through the lungs. The test measures the concentration of hydrogen or methane in the breath samples taken at regular intervals. An increase in these gases indicates the presence of SIBO, helping to guide further treatment.     - SIBO is a condition characterized by an abnormal increase in the number of bacteria in the small intestine. This overgrowth can disrupt normal digestion and nutrient absorption, leading to symptoms such as bloating, gas, diarrhea, and abdominal pain. SIBO often results from factors like impared gut motility or anatomical changes in the intestine. It can be diagnosed through breath tests and is typically treated with antibiotics, dietary changes or probiotics.   -------------------------------------------    For irritable bowel syndrome symptoms management, we recommend the use of either FD guard or IBgard.  These are both medical foods available over-the-counter at [employer], [employer], or [employer].  FDgard is helpful for upper GI symptoms and IBgard is helpful for lower GI symptoms.  You can choose which formulary feels like it work better for symptoms management that you are experiencing.

## 2025-06-26 ENCOUNTER — PREP FOR SURGERY (OUTPATIENT)
Dept: SURGERY | Facility: SURGERY CENTER | Age: 35
End: 2025-06-26
Payer: COMMERCIAL

## 2025-06-26 ENCOUNTER — OFFICE VISIT (OUTPATIENT)
Dept: FAMILY MEDICINE CLINIC | Facility: CLINIC | Age: 35
End: 2025-06-26
Payer: COMMERCIAL

## 2025-06-26 VITALS
WEIGHT: 121 LBS | SYSTOLIC BLOOD PRESSURE: 101 MMHG | HEART RATE: 87 BPM | BODY MASS INDEX: 22.13 KG/M2 | TEMPERATURE: 98 F | OXYGEN SATURATION: 98 % | DIASTOLIC BLOOD PRESSURE: 67 MMHG

## 2025-06-26 DIAGNOSIS — R53.83 OTHER FATIGUE: ICD-10-CM

## 2025-06-26 DIAGNOSIS — R14.0 BLOATING SYMPTOM: Primary | ICD-10-CM

## 2025-06-26 DIAGNOSIS — K59.00 CONSTIPATION, UNSPECIFIED CONSTIPATION TYPE: ICD-10-CM

## 2025-06-26 DIAGNOSIS — G24.5 BLEPHAROSPASM OF LEFT EYE: Primary | ICD-10-CM

## 2025-06-26 DIAGNOSIS — R10.84 GENERALIZED ABDOMINAL PAIN: ICD-10-CM

## 2025-06-26 PROCEDURE — 99214 OFFICE O/P EST MOD 30 MIN: CPT | Performed by: NURSE PRACTITIONER

## 2025-06-26 RX ORDER — SODIUM CHLORIDE, SODIUM LACTATE, POTASSIUM CHLORIDE, CALCIUM CHLORIDE 600; 310; 30; 20 MG/100ML; MG/100ML; MG/100ML; MG/100ML
30 INJECTION, SOLUTION INTRAVENOUS ONCE
OUTPATIENT
Start: 2025-06-26

## 2025-06-26 RX ORDER — SODIUM CHLORIDE 0.9 % (FLUSH) 0.9 %
10 SYRINGE (ML) INJECTION AS NEEDED
OUTPATIENT
Start: 2025-06-26

## 2025-06-26 RX ORDER — SODIUM CHLORIDE 0.9 % (FLUSH) 0.9 %
3 SYRINGE (ML) INJECTION EVERY 12 HOURS SCHEDULED
OUTPATIENT
Start: 2025-06-26

## 2025-06-26 NOTE — PROGRESS NOTES
Subjective     Rachell Walker is a 35 y.o. female.     History of Present Illness  Pt is here today with c/o eye twitching.  Left eye twitching.  Started about a week ago.   Twitch is pretty constant.  1-2 cups of coffee a day.   No stress recently.   Feeling fatigued the past few weeks.   Sleeps 6-7 hours a night.   Naps daily, for about 3 hours.   She does not wake up trough the night  She does not snore  She has heavy cycles- endometriosis  She has POTs          Symptoms are: recurrent.   Onset was 1 to 6 months.   Symptoms occur: daily.  Symptoms include: change in stool and fatigue.   Pertinent negative symptoms include no abdominal pain, no anorexia, no joint pain, no congestion, no cough, no diaphoresis, no fever, no headaches, no joint swelling, no myalgias, no nausea, no neck pain, no numbness, no rash, no sore throat, no swollen glands, no dysuria, no vertigo, no visual change, no vomiting and no weakness.   Other symptom:  Eye twitching, eye strain/pressure  Treatment and/or Medications comments include: Sinus meds        The following portions of the patient's history were reviewed and updated as appropriate: allergies, current medications, past family history, past medical history, past social history, past surgical history, and problem list.    Review of Systems   Constitutional:  Positive for fatigue. Negative for diaphoresis and fever.   HENT:  Negative for congestion, sore throat and swollen glands.    Eyes:  Negative for blurred vision and double vision.   Respiratory:  Negative for cough.    Gastrointestinal:  Negative for abdominal pain, anorexia, nausea and vomiting.   Genitourinary:  Negative for dysuria.   Musculoskeletal:  Negative for joint pain, myalgias and neck pain.   Skin:  Negative for rash.   Neurological:  Negative for vertigo, weakness, numbness and headache.       Objective     /67 (BP Location: Left arm, Patient Position: Sitting, Cuff Size: Large Adult)   Pulse 87    Temp 98 °F (36.7 °C) (Tympanic)   Wt 54.9 kg (121 lb)   SpO2 98%   BMI 22.13 kg/m²     Current Outpatient Medications on File Prior to Visit   Medication Sig Dispense Refill    multivitamin (MULTI VITAMIN DAILY PO) Take 1 tablet by mouth Daily.      PARoxetine CR (PAXIL-CR) 37.5 MG 24 hr tablet Take 1 tablet by mouth Every Night.      Plecanatide (Trulance) 3 MG tablet Take 1 tablet by mouth Daily. 90 tablet 3    polyethylene glycol (MiraLax) 17 GM/SCOOP powder Daily.      traZODone (DESYREL) 100 MG tablet Take 1 tablet by mouth Every Night.       No current facility-administered medications on file prior to visit.        BMI is within normal parameters. No other follow-up for BMI required.       Physical Exam  Constitutional:       General: She is not in acute distress.     Appearance: Normal appearance. She is not ill-appearing.   HENT:      Head: Normocephalic and atraumatic.   Eyes:      Extraocular Movements: Extraocular movements intact.   Pulmonary:      Effort: Pulmonary effort is normal. No respiratory distress.   Skin:     General: Skin is warm and dry.   Neurological:      General: No focal deficit present.      Mental Status: She is alert and oriented to person, place, and time.   Psychiatric:         Mood and Affect: Mood normal.         Behavior: Behavior normal.         Thought Content: Thought content normal.         Judgment: Judgment normal.           Assessment & Plan     Diagnoses and all orders for this visit:    1. Blepharospasm of left eye (Primary)  Comments:  likely due to fatigue  check labs  limit strain  limit caffeine  may need to see ophthalmologist  Orders:  -     CBC & Differential  -     Comprehensive Metabolic Panel; Future  -     TSH; Future    2. Other fatigue  Comments:  check labs  treat accordingly  Orders:  -     CBC & Differential  -     Comprehensive Metabolic Panel; Future  -     TSH; Future        Start flonase nasal spray

## 2025-06-27 ENCOUNTER — LAB (OUTPATIENT)
Dept: FAMILY MEDICINE CLINIC | Facility: CLINIC | Age: 35
End: 2025-06-27
Payer: COMMERCIAL

## 2025-06-27 DIAGNOSIS — G24.5 BLEPHAROSPASM OF LEFT EYE: ICD-10-CM

## 2025-06-27 DIAGNOSIS — R53.83 OTHER FATIGUE: ICD-10-CM

## 2025-06-27 LAB
ALBUMIN SERPL-MCNC: 4.6 G/DL (ref 3.5–5.2)
ALBUMIN/GLOB SERPL: 1.8 G/DL
ALP SERPL-CCNC: 45 U/L (ref 39–117)
ALT SERPL W P-5'-P-CCNC: 9 U/L (ref 1–33)
ANION GAP SERPL CALCULATED.3IONS-SCNC: 9.5 MMOL/L (ref 5–15)
AST SERPL-CCNC: 16 U/L (ref 1–32)
BASOPHILS # BLD AUTO: 0.03 10*3/MM3 (ref 0–0.2)
BASOPHILS NFR BLD AUTO: 0.5 % (ref 0–1.5)
BILIRUB SERPL-MCNC: 0.3 MG/DL (ref 0–1.2)
BUN SERPL-MCNC: 9 MG/DL (ref 6–20)
BUN/CREAT SERPL: 14.3 (ref 7–25)
CALCIUM SPEC-SCNC: 9.4 MG/DL (ref 8.6–10.5)
CHLORIDE SERPL-SCNC: 105 MMOL/L (ref 98–107)
CO2 SERPL-SCNC: 24.5 MMOL/L (ref 22–29)
CREAT SERPL-MCNC: 0.63 MG/DL (ref 0.57–1)
DEPRECATED RDW RBC AUTO: 43.3 FL (ref 37–54)
EGFRCR SERPLBLD CKD-EPI 2021: 118.8 ML/MIN/1.73
EOSINOPHIL # BLD AUTO: 0.11 10*3/MM3 (ref 0–0.4)
EOSINOPHIL NFR BLD AUTO: 2 % (ref 0.3–6.2)
ERYTHROCYTE [DISTWIDTH] IN BLOOD BY AUTOMATED COUNT: 12.7 % (ref 12.3–15.4)
GLOBULIN UR ELPH-MCNC: 2.6 GM/DL
GLUCOSE SERPL-MCNC: 73 MG/DL (ref 65–99)
HCT VFR BLD AUTO: 36.8 % (ref 34–46.6)
HGB BLD-MCNC: 12.1 G/DL (ref 12–15.9)
IMM GRANULOCYTES # BLD AUTO: 0.02 10*3/MM3 (ref 0–0.05)
IMM GRANULOCYTES NFR BLD AUTO: 0.4 % (ref 0–0.5)
LYMPHOCYTES # BLD AUTO: 1.39 10*3/MM3 (ref 0.7–3.1)
LYMPHOCYTES NFR BLD AUTO: 25 % (ref 19.6–45.3)
MCH RBC QN AUTO: 31.3 PG (ref 26.6–33)
MCHC RBC AUTO-ENTMCNC: 32.9 G/DL (ref 31.5–35.7)
MCV RBC AUTO: 95.1 FL (ref 79–97)
MONOCYTES # BLD AUTO: 0.5 10*3/MM3 (ref 0.1–0.9)
MONOCYTES NFR BLD AUTO: 9 % (ref 5–12)
NEUTROPHILS NFR BLD AUTO: 3.51 10*3/MM3 (ref 1.7–7)
NEUTROPHILS NFR BLD AUTO: 63.1 % (ref 42.7–76)
NRBC BLD AUTO-RTO: 0 /100 WBC (ref 0–0.2)
PLATELET # BLD AUTO: 277 10*3/MM3 (ref 140–450)
PMV BLD AUTO: 10.8 FL (ref 6–12)
POTASSIUM SERPL-SCNC: 4.2 MMOL/L (ref 3.5–5.2)
PROT SERPL-MCNC: 7.2 G/DL (ref 6–8.5)
RBC # BLD AUTO: 3.87 10*6/MM3 (ref 3.77–5.28)
SODIUM SERPL-SCNC: 139 MMOL/L (ref 136–145)
TSH SERPL DL<=0.05 MIU/L-ACNC: 1.11 UIU/ML (ref 0.27–4.2)
WBC NRBC COR # BLD AUTO: 5.56 10*3/MM3 (ref 3.4–10.8)

## 2025-06-27 PROCEDURE — 85025 COMPLETE CBC W/AUTO DIFF WBC: CPT | Performed by: NURSE PRACTITIONER

## 2025-06-27 PROCEDURE — 80053 COMPREHEN METABOLIC PANEL: CPT | Performed by: NURSE PRACTITIONER

## 2025-06-27 PROCEDURE — 36415 COLL VENOUS BLD VENIPUNCTURE: CPT

## 2025-06-27 PROCEDURE — 84443 ASSAY THYROID STIM HORMONE: CPT | Performed by: NURSE PRACTITIONER

## 2025-07-02 NOTE — PROGRESS NOTES
Chief Complaint   Patient presents with    Constipation    Abdominal Pain       History of Present Illness  Patient is a 35-year-old female presents today for follow-up from previous visit with this NP on 6/19/2025 for chronic constipation  Progress Notes by Yusef Lanier APRN (06/19/2025 14:30)   (Copied text in this note has been reviewed, modified, and accurate as of 7/2/25)    -Previous visit, for her constipation and bloating, KUB x-ray and hydrogen breath test to evaluate for SIBO were ordered, she was to continued on Trulance 3 mg daily and MiraLAX.  Consider colonoscopy if symptoms worsen or not improving.    Constipation, refractory  - She has been experiencing constipation and bloating, which have been ongoing since 2009. A KUB x-ray confirmed a moderate amount of stool. She reports no presence of blood in her stool. She also experiences generalized abdominal pain associated with constipation, which is more pronounced on the lower left side.     - Previous visit, she was advised to proceed with hydrogen breath test to screen for SIBO but she has not yet completed due to instructions to discontinue Trulance for 7 days prior, which she is hesitant to do as it effectively manages her symptoms. Without Trulance, she experiences difficulty in having bowel movements. She attempted to stop Trulance for 3 days but found it uncomfortable and resumed the medication.     - She is not currently taking any fiber supplements as previous attempts did not alleviate her symptoms.        Result Review :    TSH (06/27/2025 09:26) normal    Comprehensive Metabolic Panel (06/27/2025 09:26) normal    CBC & Differential (06/27/2025 09:26) hemoglobin 12.1    Breath Hydrogen Test (06/19/2025 15:08) ordered but not completed    QuantiFERON-TB Gold Plus (03/10/2025 09:23) negative    IVANIA (10/18/2024 09:55) normal    CBC & Differential (10/18/2024 09:55) hemoglobin 11.6    Vitamin D,25-Hydroxy (10/18/2024 09:55) 29.4    TSH  "(10/18/2024 09:55) normal    Lipid Panel (10/18/2024 09:55) triglyceride 164, HDL 38, LDL 81    Hemoglobin A1c (10/18/2024 09:55) 4.80    Comprehensive Metabolic Panel (10/18/2024 09:55) normal LFT, ALK  ----------------------------------------------------------------    XR Abdomen KUB (06/19/2025 15:38)   - Moderate stool burden.    - There are prominent air-filled loops of small bowel in the left upper quadrant, nonspecific.  - There is also air within the stomach and rectum, which are nondilated. Consider further evaluation with CT, as clinically directed.    CT Abdomen Pelvis With Contrast (12/29/2022 15:46)   - Nonspecific diarrheal illness  - Mild intrahepatic biliary ductal dilatation is questioned.  Cholelithiasis with no evidence of acute cholecystitis  (Refer to report for complete details)  -------------------------------------------------  2009 Colonoscopy - no significant concerns. Patient verbally reported. Details are not recalled by patient.     Vital Signs:   /68   Pulse 95   Temp 97.8 °F (36.6 °C)   Ht 160 cm (63\")   Wt 56.7 kg (125 lb)   SpO2 98%   BMI 22.14 kg/m²     Body mass index is 22.14 kg/m².     Physical Exam  Vitals reviewed.   Constitutional:       Appearance: Normal appearance.   Pulmonary:      Effort: Pulmonary effort is normal.   Abdominal:      General: Abdomen is flat. Bowel sounds are normal. There is no distension.      Palpations: Abdomen is soft. There is no mass.      Tenderness: There is no abdominal tenderness. There is no guarding.   Musculoskeletal:         General: Normal range of motion.   Skin:     General: Skin is warm and dry.   Neurological:      General: No focal deficit present.      Mental Status: She is alert and oriented to person, place, and time.   Psychiatric:         Mood and Affect: Mood normal.         Behavior: Behavior normal.         Thought Content: Thought content normal.         Judgment: Judgment normal.       Assessment and Plan  "   Diagnoses and all orders for this visit:    1. Constipation, unspecified constipation type (Primary)    2. Bloating symptom    3. Generalized abdominal pain        Assessment & Plan  1. Constipation: Chronic.  2.  Generalized abdominal pain  - KUB x-ray confirmed moderate stool presence with air in the stomach and rectum, nondilated, indicating no obstruction. Refractory and unmanageable symptoms since 2009. Associated with lower left quadrant pain.   - Order CT scan of the abdomen and pelvis to evaluate for diverticulosis or other possible abnormalities  - Recommend colonoscopy, given duration and severity of her symptoms.    - Initiate 2-day bowel prep prior to colonoscopy.  - Recommend MiraLAX daily, increasing to twice daily if necessary, with dosage adjustments based on symptom relief.    3. Lower left quadrant pain  - Sharp pain exacerbated by pressure. Order CT scan of the abdomen and pelvis to evaluate for underlying conditions.    Follow-up  - Schedule follow-up after colonoscopy.    Patient Instructions   I recommend that we proceed with colonoscopy for further evaluation for potential etiologies for proctitis, colitis, ulceration,  ano-rectal masses, colo-rectal cancer, diverticulosis, inflammatory bowel disease, AVMs in the colon, polyps, or mass/malignancy.  Further recommendations to follow depending upon endoscopic findings and clinical course.     2 days bowel prep will be needed for this procedure      EMR Dragon/Transcription Disclaimer:  This document has been Dictated utilizing Dragon dictation.       Patient or patient representative verbalized consent for the use of Ambient Listening during the visit with  MARCIAL Naranjo for chart documentation. 7/9/2025  13:45 EDT

## 2025-07-09 ENCOUNTER — OFFICE VISIT (OUTPATIENT)
Dept: GASTROENTEROLOGY | Facility: CLINIC | Age: 35
End: 2025-07-09
Payer: COMMERCIAL

## 2025-07-09 ENCOUNTER — PREP FOR SURGERY (OUTPATIENT)
Dept: SURGERY | Facility: SURGERY CENTER | Age: 35
End: 2025-07-09
Payer: COMMERCIAL

## 2025-07-09 VITALS
BODY MASS INDEX: 22.15 KG/M2 | SYSTOLIC BLOOD PRESSURE: 106 MMHG | TEMPERATURE: 97.8 F | OXYGEN SATURATION: 98 % | DIASTOLIC BLOOD PRESSURE: 68 MMHG | HEIGHT: 63 IN | WEIGHT: 125 LBS | HEART RATE: 95 BPM

## 2025-07-09 DIAGNOSIS — R14.0 BLOATING SYMPTOM: ICD-10-CM

## 2025-07-09 DIAGNOSIS — K59.00 CONSTIPATION, UNSPECIFIED CONSTIPATION TYPE: ICD-10-CM

## 2025-07-09 DIAGNOSIS — R10.84 GENERALIZED ABDOMINAL PAIN: Primary | ICD-10-CM

## 2025-07-09 DIAGNOSIS — R10.32 LEFT LOWER QUADRANT ABDOMINAL PAIN: ICD-10-CM

## 2025-07-09 DIAGNOSIS — R10.84 GENERALIZED ABDOMINAL PAIN: ICD-10-CM

## 2025-07-09 DIAGNOSIS — K59.00 CONSTIPATION, UNSPECIFIED CONSTIPATION TYPE: Primary | ICD-10-CM

## 2025-07-09 PROCEDURE — 99214 OFFICE O/P EST MOD 30 MIN: CPT | Performed by: NURSE PRACTITIONER

## 2025-07-09 RX ORDER — SODIUM CHLORIDE 0.9 % (FLUSH) 0.9 %
3 SYRINGE (ML) INJECTION EVERY 12 HOURS SCHEDULED
OUTPATIENT
Start: 2025-07-09

## 2025-07-09 RX ORDER — AZITHROMYCIN DIHYDRATE 500 MG/1
TABLET, FILM COATED ORAL EVERY 24 HOURS
COMMUNITY
Start: 2025-07-06

## 2025-07-09 RX ORDER — SODIUM CHLORIDE 0.9 % (FLUSH) 0.9 %
10 SYRINGE (ML) INJECTION AS NEEDED
OUTPATIENT
Start: 2025-07-09

## 2025-07-09 RX ORDER — SODIUM CHLORIDE, SODIUM LACTATE, POTASSIUM CHLORIDE, CALCIUM CHLORIDE 600; 310; 30; 20 MG/100ML; MG/100ML; MG/100ML; MG/100ML
30 INJECTION, SOLUTION INTRAVENOUS ONCE
OUTPATIENT
Start: 2025-07-09

## 2025-07-09 NOTE — PATIENT INSTRUCTIONS
I recommend that we proceed with colonoscopy for further evaluation for potential etiologies for proctitis, colitis, ulceration,  ano-rectal masses, colo-rectal cancer, diverticulosis, inflammatory bowel disease, AVMs in the colon, polyps, or mass/malignancy.  Further recommendations to follow depending upon endoscopic findings and clinical course.     2 days bowel prep will be needed for this procedure

## 2025-08-14 ENCOUNTER — HOSPITAL ENCOUNTER (OUTPATIENT)
Dept: CT IMAGING | Facility: HOSPITAL | Age: 35
Discharge: HOME | End: 2025-08-14
Admitting: NURSE PRACTITIONER
Payer: COMMERCIAL

## 2025-08-14 PROCEDURE — 74177 CT ABD & PELVIS W/CONTRAST: CPT

## 2025-08-14 PROCEDURE — 25510000001 IOPAMIDOL PER 1 ML: Performed by: NURSE PRACTITIONER

## 2025-08-14 RX ORDER — IOPAMIDOL 755 MG/ML
100 INJECTION, SOLUTION INTRAVASCULAR
Status: COMPLETED | OUTPATIENT
Start: 2025-08-14 | End: 2025-08-14

## 2025-08-14 RX ADMIN — IOPAMIDOL 100 ML: 755 INJECTION, SOLUTION INTRAVENOUS at 09:59

## (undated) DEVICE — SOL IRR H2O BTL 1000ML STRL

## (undated) DEVICE — ADHS SKIN PREMIERPRO EXOFIN TOPICAL HI/VISC .5ML

## (undated) DEVICE — SLV SCD CALF HEMOFORCE DVT THERP REPROC MD

## (undated) DEVICE — UNDERGLV SURG BIOGEL INDICATOR LTX PF 7

## (undated) DEVICE — BG RETRV TISS SUPERBAG INTRO RIP/STOP NLY 10MM 240ML MD

## (undated) DEVICE — SUT VIC 0 UR6 27IN VCP603H

## (undated) DEVICE — LAPAROSCOPIC GAS CONDITIONING DEVICE.: Brand: INSUFLOW

## (undated) DEVICE — GENERAL LAPAROSCOPY CDS: Brand: MEDLINE INDUSTRIES, INC.

## (undated) DEVICE — CVR HNDL LT SURG ACCSSRY BLU STRL

## (undated) DEVICE — BLANKT WARM UPPR/BDY ARM/OUT 57X196CM

## (undated) DEVICE — ENDOPATH XCEL BLADELESS TROCARS WITH STABILITY SLEEVES: Brand: ENDOPATH XCEL

## (undated) DEVICE — GLV SURG BIOGEL LTX PF 7

## (undated) DEVICE — ENDOPATH XCEL WITH OPTIVIEW TECHNOLOGY BLADELESS TROCARS WITH STABILITY SLEEVES: Brand: ENDOPATH XCEL OPTIVIEW

## (undated) DEVICE — PASS SUT PRO BARIATRIC XL W/TROC SWABS

## (undated) DEVICE — ENDOPATH 5MM CURVED SCISSORS WITH MONOPOLAR CAUTERY: Brand: ENDOPATH

## (undated) DEVICE — THE KUMAR CATHETER®, USED IN CONJUNCTION WITH KUMAR CHOLANGIOGRAPHY® CLAMP, IS MEANT TO PROVIDE A MEANS OF LAPAROSCOPIC CHOLANGIOGRAPHY. IT COMPRISES A TRANSLUCENT TUBING ( 76 CM. LENGTH AND 16 GA. ) THAT CARRIES A 19 GA., 1.25 CM LONG NEEDLE AT THE END. THE KUMAR CATHETER® IS USED TO PUNCTURE THE HARTMANN'S POUCH OF THE GALLBLADDER FOR BILIARY ACCESS AND / OR ASPIRATION. PRODUCT IS LATEX FREE.: Brand: KUMAR CATHETER®

## (undated) DEVICE — ANTIBACTERIAL UNDYED BRAIDED (POLYGLACTIN 910), SYNTHETIC ABSORBABLE SUTURE: Brand: COATED VICRYL

## (undated) DEVICE — KT SURG TURNOVER 050